# Patient Record
Sex: MALE | Race: WHITE
[De-identification: names, ages, dates, MRNs, and addresses within clinical notes are randomized per-mention and may not be internally consistent; named-entity substitution may affect disease eponyms.]

---

## 2017-08-27 ENCOUNTER — HOSPITAL ENCOUNTER (EMERGENCY)
Dept: HOSPITAL 83 - ED | Age: 32
Discharge: HOME | End: 2017-08-27
Payer: COMMERCIAL

## 2017-08-27 VITALS — BODY MASS INDEX: 39.28 KG/M2 | WEIGHT: 290 LBS | HEIGHT: 71.97 IN

## 2017-08-27 DIAGNOSIS — Z88.2: ICD-10-CM

## 2017-08-27 DIAGNOSIS — R03.0: ICD-10-CM

## 2017-08-27 DIAGNOSIS — M54.42: Primary | ICD-10-CM

## 2017-12-13 ENCOUNTER — HOSPITAL ENCOUNTER (EMERGENCY)
Dept: HOSPITAL 83 - ED | Age: 32
Discharge: HOME | End: 2017-12-13
Payer: COMMERCIAL

## 2017-12-13 VITALS — HEIGHT: 71.97 IN | WEIGHT: 295 LBS | BODY MASS INDEX: 39.96 KG/M2

## 2017-12-13 DIAGNOSIS — L02.411: Primary | ICD-10-CM

## 2017-12-13 DIAGNOSIS — Z88.2: ICD-10-CM

## 2019-09-09 ENCOUNTER — HOSPITAL ENCOUNTER (OUTPATIENT)
Dept: HOSPITAL 83 - LAB | Age: 34
Discharge: HOME | End: 2019-09-09
Attending: NURSE PRACTITIONER
Payer: COMMERCIAL

## 2019-09-09 DIAGNOSIS — M10.071: Primary | ICD-10-CM

## 2019-09-09 DIAGNOSIS — M10.9: ICD-10-CM

## 2020-08-04 ENCOUNTER — HOSPITAL ENCOUNTER (OUTPATIENT)
Dept: HOSPITAL 83 - COVID19 | Age: 35
Discharge: HOME | End: 2020-08-04
Attending: NURSE PRACTITIONER
Payer: COMMERCIAL

## 2020-08-04 DIAGNOSIS — R50.9: ICD-10-CM

## 2020-08-04 DIAGNOSIS — R52: ICD-10-CM

## 2020-08-04 DIAGNOSIS — R05: ICD-10-CM

## 2020-08-04 DIAGNOSIS — Z20.828: Primary | ICD-10-CM

## 2021-01-01 ENCOUNTER — APPOINTMENT (OUTPATIENT)
Dept: GENERAL RADIOLOGY | Age: 36
DRG: 871 | End: 2021-01-01
Payer: COMMERCIAL

## 2021-01-01 ENCOUNTER — APPOINTMENT (OUTPATIENT)
Dept: CT IMAGING | Age: 36
DRG: 871 | End: 2021-01-01
Payer: COMMERCIAL

## 2021-01-01 ENCOUNTER — HOSPITAL ENCOUNTER (INPATIENT)
Age: 36
LOS: 4 days | DRG: 871 | End: 2021-05-27
Attending: EMERGENCY MEDICINE | Admitting: INTERNAL MEDICINE
Payer: COMMERCIAL

## 2021-01-01 VITALS
SYSTOLIC BLOOD PRESSURE: 92 MMHG | WEIGHT: 293.25 LBS | TEMPERATURE: 99.4 F | BODY MASS INDEX: 38.87 KG/M2 | OXYGEN SATURATION: 91 % | HEIGHT: 73 IN | HEART RATE: 50 BPM | DIASTOLIC BLOOD PRESSURE: 51 MMHG | RESPIRATION RATE: 12 BRPM

## 2021-01-01 DIAGNOSIS — R06.09 DYSPNEA ON EXERTION: ICD-10-CM

## 2021-01-01 DIAGNOSIS — U07.1 COVID-19: Primary | ICD-10-CM

## 2021-01-01 DIAGNOSIS — J96.01 ACUTE RESPIRATORY FAILURE WITH HYPOXIA (HCC): ICD-10-CM

## 2021-01-01 LAB
AADO2: 219.9 MMHG
AADO2: 594.8 MMHG
ALBUMIN SERPL-MCNC: 3 G/DL (ref 3.5–5.2)
ALBUMIN SERPL-MCNC: 3.5 G/DL (ref 3.5–5.2)
ALBUMIN SERPL-MCNC: 3.9 G/DL (ref 3.5–5.2)
ALBUMIN SERPL-MCNC: 4.1 G/DL (ref 3.5–5.2)
ALP BLD-CCNC: 108 U/L (ref 40–129)
ALP BLD-CCNC: 67 U/L (ref 40–129)
ALP BLD-CCNC: 76 U/L (ref 40–129)
ALP BLD-CCNC: 79 U/L (ref 40–129)
ALT SERPL-CCNC: 26 U/L (ref 0–40)
ALT SERPL-CCNC: 27 U/L (ref 0–40)
ALT SERPL-CCNC: 31 U/L (ref 0–40)
ALT SERPL-CCNC: 35 U/L (ref 0–40)
ANION GAP SERPL CALCULATED.3IONS-SCNC: 11 MMOL/L (ref 7–16)
ANION GAP SERPL CALCULATED.3IONS-SCNC: 12 MMOL/L (ref 7–16)
ANION GAP SERPL CALCULATED.3IONS-SCNC: 13 MMOL/L (ref 7–16)
ANION GAP SERPL CALCULATED.3IONS-SCNC: 14 MMOL/L (ref 7–16)
ANISOCYTOSIS: ABNORMAL
ANISOCYTOSIS: ABNORMAL
APTT: 28.4 SEC (ref 24.5–35.1)
AST SERPL-CCNC: 25 U/L (ref 0–39)
AST SERPL-CCNC: 32 U/L (ref 0–39)
AST SERPL-CCNC: 35 U/L (ref 0–39)
AST SERPL-CCNC: 37 U/L (ref 0–39)
ATYPICAL LYMPHOCYTE RELATIVE PERCENT: 0.9 % (ref 0–4)
B.E.: -1.8 MMOL/L (ref -3–0)
B.E.: -1.8 MMOL/L (ref -3–3)
B.E.: -6.5 MMOL/L (ref -3–3)
BASOPHILS ABSOLUTE: 0 E9/L (ref 0–0.2)
BASOPHILS ABSOLUTE: 0.01 E9/L (ref 0–0.2)
BASOPHILS ABSOLUTE: 0.01 E9/L (ref 0–0.2)
BASOPHILS RELATIVE PERCENT: 0 % (ref 0–2)
BASOPHILS RELATIVE PERCENT: 0.2 % (ref 0–2)
BASOPHILS RELATIVE PERCENT: 0.2 % (ref 0–2)
BILIRUB SERPL-MCNC: 0.5 MG/DL (ref 0–1.2)
BILIRUB SERPL-MCNC: 0.5 MG/DL (ref 0–1.2)
BILIRUB SERPL-MCNC: 0.6 MG/DL (ref 0–1.2)
BILIRUB SERPL-MCNC: 0.7 MG/DL (ref 0–1.2)
BILIRUBIN DIRECT: <0.2 MG/DL (ref 0–0.3)
BILIRUBIN, INDIRECT: ABNORMAL MG/DL (ref 0–1)
BUN BLDV-MCNC: 17 MG/DL (ref 6–20)
BUN BLDV-MCNC: 19 MG/DL (ref 6–20)
BUN BLDV-MCNC: 20 MG/DL (ref 6–20)
BUN BLDV-MCNC: 24 MG/DL (ref 6–20)
C-REACTIVE PROTEIN: 1.6 MG/DL (ref 0–0.4)
C-REACTIVE PROTEIN: 12.2 MG/DL (ref 0–0.4)
C-REACTIVE PROTEIN: 5.8 MG/DL (ref 0–0.4)
C-REACTIVE PROTEIN: 6.7 MG/DL (ref 0–0.4)
CALCIUM SERPL-MCNC: 8.6 MG/DL (ref 8.6–10.2)
CALCIUM SERPL-MCNC: 8.6 MG/DL (ref 8.6–10.2)
CALCIUM SERPL-MCNC: 8.9 MG/DL (ref 8.6–10.2)
CALCIUM SERPL-MCNC: 9.4 MG/DL (ref 8.6–10.2)
CHLORIDE BLD-SCNC: 100 MMOL/L (ref 98–107)
CHLORIDE BLD-SCNC: 102 MMOL/L (ref 98–107)
CHLORIDE BLD-SCNC: 105 MMOL/L (ref 98–107)
CHLORIDE BLD-SCNC: 105 MMOL/L (ref 98–107)
CO2: 20 MMOL/L (ref 22–29)
CO2: 22 MMOL/L (ref 22–29)
CO2: 24 MMOL/L (ref 22–29)
CO2: 26 MMOL/L (ref 22–29)
COHB: 0.7 % (ref 0–1.5)
COHB: 0.8 % (ref 0–1.5)
CREAT SERPL-MCNC: 0.8 MG/DL (ref 0.7–1.2)
CREAT SERPL-MCNC: 0.9 MG/DL (ref 0.7–1.2)
CREAT SERPL-MCNC: 1 MG/DL (ref 0.7–1.2)
CREAT SERPL-MCNC: 1.1 MG/DL (ref 0.7–1.2)
CRITICAL: ABNORMAL
CRITICAL: NORMAL
D DIMER: 1231 NG/ML DDU
D DIMER: 333 NG/ML DDU
D DIMER: >5250 NG/ML DDU
DATE ANALYZED: ABNORMAL
DATE ANALYZED: NORMAL
DATE OF COLLECTION: ABNORMAL
DATE OF COLLECTION: NORMAL
DELIVERY SYSTEMS: ABNORMAL
DEVICE: ABNORMAL
EKG ATRIAL RATE: 92 BPM
EKG P AXIS: 33 DEGREES
EKG P-R INTERVAL: 130 MS
EKG Q-T INTERVAL: 340 MS
EKG QRS DURATION: 96 MS
EKG QTC CALCULATION (BAZETT): 420 MS
EKG R AXIS: 4 DEGREES
EKG T AXIS: 10 DEGREES
EKG VENTRICULAR RATE: 92 BPM
EOSINOPHILS ABSOLUTE: 0 E9/L (ref 0.05–0.5)
EOSINOPHILS RELATIVE PERCENT: 0 % (ref 0–6)
FERRITIN: 1677 NG/ML
FIBRINOGEN: 212 MG/DL (ref 225–540)
FIBRINOGEN: >700 MG/DL (ref 225–540)
FIO2 ARTERIAL: 100
FIO2: 100 %
FIO2: 50 %
GFR AFRICAN AMERICAN: >60
GFR NON-AFRICAN AMERICAN: >60 ML/MIN/1.73
GLUCOSE BLD-MCNC: 157 MG/DL (ref 74–99)
GLUCOSE BLD-MCNC: 172 MG/DL (ref 74–99)
GLUCOSE BLD-MCNC: 184 MG/DL (ref 74–99)
GLUCOSE BLD-MCNC: 195 MG/DL (ref 74–99)
HBA1C MFR BLD: 6.2 % (ref 4–5.6)
HCO3 ARTERIAL: 24.6 MMOL/L (ref 22–26)
HCO3: 17.7 MMOL/L (ref 22–26)
HCO3: 22.2 MMOL/L (ref 22–26)
HCT VFR BLD CALC: 40.9 % (ref 37–54)
HCT VFR BLD CALC: 42.5 % (ref 37–54)
HCT VFR BLD CALC: 44.8 % (ref 37–54)
HCT VFR BLD CALC: 47.5 % (ref 37–54)
HCT VFR BLD CALC: 47.6 % (ref 37–54)
HEMOGLOBIN: 13.7 G/DL (ref 12.5–16.5)
HEMOGLOBIN: 13.7 G/DL (ref 12.5–16.5)
HEMOGLOBIN: 14.4 G/DL (ref 12.5–16.5)
HEMOGLOBIN: 15.5 G/DL (ref 12.5–16.5)
HEMOGLOBIN: 15.5 G/DL (ref 12.5–16.5)
HHB: 3.4 % (ref 0–5)
HHB: 9.7 % (ref 0–5)
IMMATURE GRANULOCYTES #: 0.08 E9/L
IMMATURE GRANULOCYTES #: 0.17 E9/L
IMMATURE GRANULOCYTES %: 1.4 % (ref 0–5)
IMMATURE GRANULOCYTES %: 4.2 % (ref 0–5)
INR BLD: 1.1
LAB: ABNORMAL
LAB: NORMAL
LACTATE DEHYDROGENASE: 583 U/L (ref 135–225)
LACTIC ACID: 1.3 MMOL/L (ref 0.5–2.2)
LACTIC ACID: 2.8 MMOL/L (ref 0.5–2.2)
LYMPHOCYTES ABSOLUTE: 0.48 E9/L (ref 1.5–4)
LYMPHOCYTES ABSOLUTE: 0.58 E9/L (ref 1.5–4)
LYMPHOCYTES ABSOLUTE: 0.63 E9/L (ref 1.5–4)
LYMPHOCYTES ABSOLUTE: 0.93 E9/L (ref 1.5–4)
LYMPHOCYTES ABSOLUTE: 1.47 E9/L (ref 1.5–4)
LYMPHOCYTES RELATIVE PERCENT: 11.6 % (ref 20–42)
LYMPHOCYTES RELATIVE PERCENT: 14.2 % (ref 20–42)
LYMPHOCYTES RELATIVE PERCENT: 16.1 % (ref 20–42)
LYMPHOCYTES RELATIVE PERCENT: 7 % (ref 20–42)
LYMPHOCYTES RELATIVE PERCENT: 8.7 % (ref 20–42)
Lab: ABNORMAL
Lab: NORMAL
MAGNESIUM: 2.4 MG/DL (ref 1.6–2.6)
MAGNESIUM: 2.7 MG/DL (ref 1.6–2.6)
MCH RBC QN AUTO: 27.7 PG (ref 26–35)
MCH RBC QN AUTO: 28.3 PG (ref 26–35)
MCH RBC QN AUTO: 28.8 PG (ref 26–35)
MCHC RBC AUTO-ENTMCNC: 32.1 % (ref 32–34.5)
MCHC RBC AUTO-ENTMCNC: 32.2 % (ref 32–34.5)
MCHC RBC AUTO-ENTMCNC: 32.6 % (ref 32–34.5)
MCHC RBC AUTO-ENTMCNC: 32.6 % (ref 32–34.5)
MCHC RBC AUTO-ENTMCNC: 33.5 % (ref 32–34.5)
MCV RBC AUTO: 86.1 FL (ref 80–99.9)
MCV RBC AUTO: 86.2 FL (ref 80–99.9)
MCV RBC AUTO: 86.8 FL (ref 80–99.9)
MCV RBC AUTO: 86.9 FL (ref 80–99.9)
MCV RBC AUTO: 87.8 FL (ref 80–99.9)
METAMYELOCYTES RELATIVE PERCENT: 1.7 % (ref 0–1)
METAMYELOCYTES RELATIVE PERCENT: 1.8 % (ref 0–1)
METAMYELOCYTES RELATIVE PERCENT: 2 % (ref 0–1)
METER GLUCOSE: 174 MG/DL (ref 74–99)
METHB: 0.2 % (ref 0–1.5)
METHB: 0.3 % (ref 0–1.5)
MODE: ABNORMAL
MODE: NORMAL
MONOCYTES ABSOLUTE: 0.18 E9/L (ref 0.1–0.95)
MONOCYTES ABSOLUTE: 0.23 E9/L (ref 0.1–0.95)
MONOCYTES ABSOLUTE: 0.26 E9/L (ref 0.1–0.95)
MONOCYTES ABSOLUTE: 0.42 E9/L (ref 0.1–0.95)
MONOCYTES ABSOLUTE: 0.42 E9/L (ref 0.1–0.95)
MONOCYTES RELATIVE PERCENT: 1.8 % (ref 2–12)
MONOCYTES RELATIVE PERCENT: 2 % (ref 2–12)
MONOCYTES RELATIVE PERCENT: 6.4 % (ref 2–12)
MONOCYTES RELATIVE PERCENT: 7.3 % (ref 2–12)
MONOCYTES RELATIVE PERCENT: 7.8 % (ref 2–12)
MYELOCYTE PERCENT: 0.9 % (ref 0–0)
NEUTROPHILS ABSOLUTE: 3.06 E9/L (ref 1.8–7.3)
NEUTROPHILS ABSOLUTE: 4.34 E9/L (ref 1.8–7.3)
NEUTROPHILS ABSOLUTE: 4.45 E9/L (ref 1.8–7.3)
NEUTROPHILS ABSOLUTE: 8.19 E9/L (ref 1.8–7.3)
NEUTROPHILS ABSOLUTE: 9.72 E9/L (ref 1.8–7.3)
NEUTROPHILS RELATIVE PERCENT: 75 % (ref 43–80)
NEUTROPHILS RELATIVE PERCENT: 75 % (ref 43–80)
NEUTROPHILS RELATIVE PERCENT: 80.9 % (ref 43–80)
NEUTROPHILS RELATIVE PERCENT: 83.9 % (ref 43–80)
NEUTROPHILS RELATIVE PERCENT: 89 % (ref 43–80)
NUCLEATED RED BLOOD CELLS: 0 /100 WBC
NUCLEATED RED BLOOD CELLS: 0 /100 WBC
NUCLEATED RED BLOOD CELLS: 1 /100 WBC
O2 CONTENT: 21 ML/DL
O2 CONTENT: 21.3 ML/DL
O2 SATURATION: 82.5 % (ref 92–98.5)
O2 SATURATION: 90.2 % (ref 92–98.5)
O2 SATURATION: 96.6 % (ref 92–98.5)
O2HB: 89.2 % (ref 94–97)
O2HB: 95.7 % (ref 94–97)
OPERATOR ID: 166
OPERATOR ID: 166
OPERATOR ID: 516
PATIENT TEMP: 37 C
PATIENT TEMP: 37 C
PCO2 ARTERIAL: 48.1 MMHG (ref 35–45)
PCO2: 32.4 MMHG (ref 35–45)
PCO2: 35.8 MMHG (ref 35–45)
PDW BLD-RTO: 12.4 FL (ref 11.5–15)
PDW BLD-RTO: 12.4 FL (ref 11.5–15)
PDW BLD-RTO: 12.6 FL (ref 11.5–15)
PDW BLD-RTO: 12.6 FL (ref 11.5–15)
PDW BLD-RTO: 12.7 FL (ref 11.5–15)
PEEP/CPAP: 6 CMH2O
PFO2: 0.61 MMHG/%
PFO2: 1.68 MMHG/%
PH BLOOD GAS: 7.32 (ref 7.35–7.45)
PH BLOOD GAS: 7.36 (ref 7.35–7.45)
PH BLOOD GAS: 7.41 (ref 7.35–7.45)
PHOSPHORUS: 3.8 MG/DL (ref 2.5–4.5)
PHOSPHORUS: 5.4 MG/DL (ref 2.5–4.5)
PIP: 12 CMH2O
PLATELET # BLD: 199 E9/L (ref 130–450)
PLATELET # BLD: 218 E9/L (ref 130–450)
PLATELET # BLD: 247 E9/L (ref 130–450)
PLATELET # BLD: 280 E9/L (ref 130–450)
PLATELET # BLD: 281 E9/L (ref 130–450)
PMV BLD AUTO: 9.4 FL (ref 7–12)
PMV BLD AUTO: 9.5 FL (ref 7–12)
PO2 ARTERIAL: 51.3 MMHG (ref 80–100)
PO2: 60.8 MMHG (ref 75–100)
PO2: 83.8 MMHG (ref 75–100)
POLYCHROMASIA: ABNORMAL
POTASSIUM REFLEX MAGNESIUM: 4.5 MMOL/L (ref 3.5–5)
POTASSIUM REFLEX MAGNESIUM: 4.6 MMOL/L (ref 3.5–5)
POTASSIUM SERPL-SCNC: 4.8 MMOL/L (ref 3.5–5)
POTASSIUM SERPL-SCNC: 4.8 MMOL/L (ref 3.5–5)
PRO-BNP: 53 PG/ML (ref 0–125)
PROCALCITONIN: 0.1 NG/ML (ref 0–0.08)
PROCALCITONIN: 0.11 NG/ML (ref 0–0.08)
PROCALCITONIN: 0.13 NG/ML (ref 0–0.08)
PROCALCITONIN: 0.2 NG/ML (ref 0–0.08)
PROTHROMBIN TIME: 11.8 SEC (ref 9.3–12.4)
RBC # BLD: 4.75 E12/L (ref 3.8–5.8)
RBC # BLD: 4.84 E12/L (ref 3.8–5.8)
RBC # BLD: 5.2 E12/L (ref 3.8–5.8)
RBC # BLD: 5.47 E12/L (ref 3.8–5.8)
RBC # BLD: 5.48 E12/L (ref 3.8–5.8)
RBC # BLD: NORMAL 10*6/UL
RI(T): 262 %
RI(T): 978 %
SARS-COV-2: DETECTED
SEDIMENTATION RATE, ERYTHROCYTE: 0 MM/HR (ref 0–15)
SODIUM BLD-SCNC: 136 MMOL/L (ref 132–146)
SODIUM BLD-SCNC: 139 MMOL/L (ref 132–146)
SODIUM BLD-SCNC: 139 MMOL/L (ref 132–146)
SODIUM BLD-SCNC: 140 MMOL/L (ref 132–146)
SOURCE, BLOOD GAS: ABNORMAL
SOURCE, BLOOD GAS: ABNORMAL
SOURCE, BLOOD GAS: NORMAL
THB: 15.6 G/DL (ref 11.5–16.5)
THB: 17 G/DL (ref 11.5–16.5)
TIME ANALYZED: 1039
TIME ANALYZED: 1510
TOTAL PROTEIN: 6.6 G/DL (ref 6.4–8.3)
TOTAL PROTEIN: 6.9 G/DL (ref 6.4–8.3)
TOTAL PROTEIN: 7 G/DL (ref 6.4–8.3)
TOTAL PROTEIN: 7.9 G/DL (ref 6.4–8.3)
TROPONIN: <0.01 NG/ML (ref 0–0.03)
WBC # BLD: 11.3 E9/L (ref 4.5–11.5)
WBC # BLD: 4.1 E9/L (ref 4.5–11.5)
WBC # BLD: 5.3 E9/L (ref 4.5–11.5)
WBC # BLD: 5.8 E9/L (ref 4.5–11.5)
WBC # BLD: 9 E9/L (ref 4.5–11.5)

## 2021-01-01 PROCEDURE — 84145 PROCALCITONIN (PCT): CPT

## 2021-01-01 PROCEDURE — 85025 COMPLETE CBC W/AUTO DIFF WBC: CPT

## 2021-01-01 PROCEDURE — 6360000002 HC RX W HCPCS

## 2021-01-01 PROCEDURE — 80048 BASIC METABOLIC PNL TOTAL CA: CPT

## 2021-01-01 PROCEDURE — 2500000003 HC RX 250 WO HCPCS

## 2021-01-01 PROCEDURE — 84100 ASSAY OF PHOSPHORUS: CPT

## 2021-01-01 PROCEDURE — 82805 BLOOD GASES W/O2 SATURATION: CPT

## 2021-01-01 PROCEDURE — 2060000000 HC ICU INTERMEDIATE R&B

## 2021-01-01 PROCEDURE — 71045 X-RAY EXAM CHEST 1 VIEW: CPT

## 2021-01-01 PROCEDURE — 85651 RBC SED RATE NONAUTOMATED: CPT

## 2021-01-01 PROCEDURE — 6360000002 HC RX W HCPCS: Performed by: STUDENT IN AN ORGANIZED HEALTH CARE EDUCATION/TRAINING PROGRAM

## 2021-01-01 PROCEDURE — 36415 COLL VENOUS BLD VENIPUNCTURE: CPT

## 2021-01-01 PROCEDURE — XW033E5 INTRODUCTION OF REMDESIVIR ANTI-INFECTIVE INTO PERIPHERAL VEIN, PERCUTANEOUS APPROACH, NEW TECHNOLOGY GROUP 5: ICD-10-PCS | Performed by: INTERNAL MEDICINE

## 2021-01-01 PROCEDURE — 0W9930Z DRAINAGE OF RIGHT PLEURAL CAVITY WITH DRAINAGE DEVICE, PERCUTANEOUS APPROACH: ICD-10-PCS | Performed by: INTERNAL MEDICINE

## 2021-01-01 PROCEDURE — 2500000003 HC RX 250 WO HCPCS: Performed by: STUDENT IN AN ORGANIZED HEALTH CARE EDUCATION/TRAINING PROGRAM

## 2021-01-01 PROCEDURE — 94660 CPAP INITIATION&MGMT: CPT

## 2021-01-01 PROCEDURE — 99232 SBSQ HOSP IP/OBS MODERATE 35: CPT | Performed by: INTERNAL MEDICINE

## 2021-01-01 PROCEDURE — 83036 HEMOGLOBIN GLYCOSYLATED A1C: CPT

## 2021-01-01 PROCEDURE — 2500000003 HC RX 250 WO HCPCS: Performed by: INTERNAL MEDICINE

## 2021-01-01 PROCEDURE — 2580000003 HC RX 258: Performed by: EMERGENCY MEDICINE

## 2021-01-01 PROCEDURE — 2580000003 HC RX 258: Performed by: INTERNAL MEDICINE

## 2021-01-01 PROCEDURE — 99223 1ST HOSP IP/OBS HIGH 75: CPT | Performed by: INTERNAL MEDICINE

## 2021-01-01 PROCEDURE — 85730 THROMBOPLASTIN TIME PARTIAL: CPT

## 2021-01-01 PROCEDURE — 6370000000 HC RX 637 (ALT 250 FOR IP): Performed by: NURSE PRACTITIONER

## 2021-01-01 PROCEDURE — 86140 C-REACTIVE PROTEIN: CPT

## 2021-01-01 PROCEDURE — 6360000002 HC RX W HCPCS: Performed by: INTERNAL MEDICINE

## 2021-01-01 PROCEDURE — 32551 INSERTION OF CHEST TUBE: CPT

## 2021-01-01 PROCEDURE — 96374 THER/PROPH/DIAG INJ IV PUSH: CPT

## 2021-01-01 PROCEDURE — 6370000000 HC RX 637 (ALT 250 FOR IP): Performed by: INTERNAL MEDICINE

## 2021-01-01 PROCEDURE — 85378 FIBRIN DEGRADE SEMIQUANT: CPT

## 2021-01-01 PROCEDURE — 85610 PROTHROMBIN TIME: CPT

## 2021-01-01 PROCEDURE — 2700000000 HC OXYGEN THERAPY PER DAY

## 2021-01-01 PROCEDURE — 6360000002 HC RX W HCPCS: Performed by: EMERGENCY MEDICINE

## 2021-01-01 PROCEDURE — C9113 INJ PANTOPRAZOLE SODIUM, VIA: HCPCS | Performed by: STUDENT IN AN ORGANIZED HEALTH CARE EDUCATION/TRAINING PROGRAM

## 2021-01-01 PROCEDURE — 6360000004 HC RX CONTRAST MEDICATION: Performed by: RADIOLOGY

## 2021-01-01 PROCEDURE — 83735 ASSAY OF MAGNESIUM: CPT

## 2021-01-01 PROCEDURE — 36600 WITHDRAWAL OF ARTERIAL BLOOD: CPT

## 2021-01-01 PROCEDURE — 2000000000 HC ICU R&B

## 2021-01-01 PROCEDURE — 0BH17EZ INSERTION OF ENDOTRACHEAL AIRWAY INTO TRACHEA, VIA NATURAL OR ARTIFICIAL OPENING: ICD-10-PCS | Performed by: INTERNAL MEDICINE

## 2021-01-01 PROCEDURE — 2580000003 HC RX 258: Performed by: STUDENT IN AN ORGANIZED HEALTH CARE EDUCATION/TRAINING PROGRAM

## 2021-01-01 PROCEDURE — 85384 FIBRINOGEN ACTIVITY: CPT

## 2021-01-01 PROCEDURE — 82962 GLUCOSE BLOOD TEST: CPT

## 2021-01-01 PROCEDURE — 82803 BLOOD GASES ANY COMBINATION: CPT

## 2021-01-01 PROCEDURE — 99285 EMERGENCY DEPT VISIT HI MDM: CPT

## 2021-01-01 PROCEDURE — 83605 ASSAY OF LACTIC ACID: CPT

## 2021-01-01 PROCEDURE — 51702 INSERT TEMP BLADDER CATH: CPT

## 2021-01-01 PROCEDURE — 0W9B30Z DRAINAGE OF LEFT PLEURAL CAVITY WITH DRAINAGE DEVICE, PERCUTANEOUS APPROACH: ICD-10-PCS | Performed by: INTERNAL MEDICINE

## 2021-01-01 PROCEDURE — 83880 ASSAY OF NATRIURETIC PEPTIDE: CPT

## 2021-01-01 PROCEDURE — 6370000000 HC RX 637 (ALT 250 FOR IP): Performed by: EMERGENCY MEDICINE

## 2021-01-01 PROCEDURE — 80053 COMPREHEN METABOLIC PANEL: CPT

## 2021-01-01 PROCEDURE — 92950 HEART/LUNG RESUSCITATION CPR: CPT

## 2021-01-01 PROCEDURE — 71275 CT ANGIOGRAPHY CHEST: CPT

## 2021-01-01 PROCEDURE — 80076 HEPATIC FUNCTION PANEL: CPT

## 2021-01-01 PROCEDURE — 93010 ELECTROCARDIOGRAM REPORT: CPT | Performed by: INTERNAL MEDICINE

## 2021-01-01 PROCEDURE — 6370000000 HC RX 637 (ALT 250 FOR IP): Performed by: STUDENT IN AN ORGANIZED HEALTH CARE EDUCATION/TRAINING PROGRAM

## 2021-01-01 PROCEDURE — 84484 ASSAY OF TROPONIN QUANT: CPT

## 2021-01-01 PROCEDURE — 87040 BLOOD CULTURE FOR BACTERIA: CPT

## 2021-01-01 PROCEDURE — 82728 ASSAY OF FERRITIN: CPT

## 2021-01-01 PROCEDURE — 93005 ELECTROCARDIOGRAM TRACING: CPT | Performed by: EMERGENCY MEDICINE

## 2021-01-01 PROCEDURE — 31500 INSERT EMERGENCY AIRWAY: CPT

## 2021-01-01 PROCEDURE — 5A1935Z RESPIRATORY VENTILATION, LESS THAN 24 CONSECUTIVE HOURS: ICD-10-PCS | Performed by: INTERNAL MEDICINE

## 2021-01-01 PROCEDURE — 83615 LACTATE (LD) (LDH) ENZYME: CPT

## 2021-01-01 RX ORDER — MIDAZOLAM HYDROCHLORIDE 2 MG/2ML
4 INJECTION, SOLUTION INTRAMUSCULAR; INTRAVENOUS ONCE
Status: DISCONTINUED | OUTPATIENT
Start: 2021-01-01 | End: 2021-05-28 | Stop reason: HOSPADM

## 2021-01-01 RX ORDER — CALCIUM CHLORIDE 100 MG/ML
INJECTION INTRAVENOUS; INTRAVENTRICULAR
Status: COMPLETED | OUTPATIENT
Start: 2021-01-01 | End: 2021-01-01

## 2021-01-01 RX ORDER — SODIUM CHLORIDE 0.9 % (FLUSH) 0.9 %
5-40 SYRINGE (ML) INJECTION EVERY 12 HOURS SCHEDULED
Status: DISCONTINUED | OUTPATIENT
Start: 2021-01-01 | End: 2021-05-28 | Stop reason: HOSPADM

## 2021-01-01 RX ORDER — BENZONATATE 100 MG/1
100 CAPSULE ORAL 3 TIMES DAILY PRN
Status: DISCONTINUED | OUTPATIENT
Start: 2021-01-01 | End: 2021-05-28 | Stop reason: HOSPADM

## 2021-01-01 RX ORDER — MIDAZOLAM HYDROCHLORIDE 2 MG/2ML
2 INJECTION, SOLUTION INTRAMUSCULAR; INTRAVENOUS ONCE
Status: COMPLETED | OUTPATIENT
Start: 2021-01-01 | End: 2021-01-01

## 2021-01-01 RX ORDER — CHOLECALCIFEROL (VITAMIN D3) 50 MCG
2000 TABLET ORAL DAILY
Status: DISCONTINUED | OUTPATIENT
Start: 2021-01-01 | End: 2021-05-28 | Stop reason: HOSPADM

## 2021-01-01 RX ORDER — POTASSIUM CHLORIDE 7.45 MG/ML
10 INJECTION INTRAVENOUS
Status: DISPENSED | OUTPATIENT
Start: 2021-01-01 | End: 2021-01-01

## 2021-01-01 RX ORDER — DIPHENHYDRAMINE HCL 25 MG
25 TABLET ORAL NIGHTLY PRN
Status: DISCONTINUED | OUTPATIENT
Start: 2021-01-01 | End: 2021-05-28 | Stop reason: HOSPADM

## 2021-01-01 RX ORDER — EPINEPHRINE 0.1 MG/ML
SYRINGE (ML) INJECTION
Status: COMPLETED | OUTPATIENT
Start: 2021-01-01 | End: 2021-01-01

## 2021-01-01 RX ORDER — PROPOFOL 10 MG/ML
INJECTION, EMULSION INTRAVENOUS
Status: DISCONTINUED
Start: 2021-01-01 | End: 2021-05-28 | Stop reason: HOSPADM

## 2021-01-01 RX ORDER — CALCIUM CARBONATE 200(500)MG
500 TABLET,CHEWABLE ORAL 3 TIMES DAILY PRN
Status: DISCONTINUED | OUTPATIENT
Start: 2021-01-01 | End: 2021-05-28 | Stop reason: HOSPADM

## 2021-01-01 RX ORDER — DOPAMINE HYDROCHLORIDE 320 MG/100ML
INJECTION, SOLUTION INTRAVENOUS CONTINUOUS PRN
Status: COMPLETED | OUTPATIENT
Start: 2021-01-01 | End: 2021-01-01

## 2021-01-01 RX ORDER — SODIUM CHLORIDE 9 MG/ML
10 INJECTION INTRAVENOUS DAILY
Status: DISCONTINUED | OUTPATIENT
Start: 2021-01-01 | End: 2021-05-28 | Stop reason: HOSPADM

## 2021-01-01 RX ORDER — ALBUTEROL SULFATE 90 UG/1
2 AEROSOL, METERED RESPIRATORY (INHALATION) EVERY 6 HOURS PRN
Status: DISCONTINUED | OUTPATIENT
Start: 2021-01-01 | End: 2021-05-28 | Stop reason: HOSPADM

## 2021-01-01 RX ORDER — LORAZEPAM 2 MG/ML
0.5 INJECTION INTRAMUSCULAR ONCE
Status: COMPLETED | OUTPATIENT
Start: 2021-01-01 | End: 2021-01-01

## 2021-01-01 RX ORDER — PANTOPRAZOLE SODIUM 40 MG/10ML
40 INJECTION, POWDER, LYOPHILIZED, FOR SOLUTION INTRAVENOUS DAILY
Status: DISCONTINUED | OUTPATIENT
Start: 2021-01-01 | End: 2021-05-28 | Stop reason: HOSPADM

## 2021-01-01 RX ORDER — ACETAMINOPHEN 325 MG/1
650 TABLET ORAL EVERY 6 HOURS PRN
Status: DISCONTINUED | OUTPATIENT
Start: 2021-01-01 | End: 2021-05-28 | Stop reason: HOSPADM

## 2021-01-01 RX ORDER — SODIUM CHLORIDE 0.9 % (FLUSH) 0.9 %
5-40 SYRINGE (ML) INJECTION PRN
Status: DISCONTINUED | OUTPATIENT
Start: 2021-01-01 | End: 2021-05-28 | Stop reason: HOSPADM

## 2021-01-01 RX ORDER — SODIUM CHLORIDE 9 MG/ML
25 INJECTION, SOLUTION INTRAVENOUS PRN
Status: DISCONTINUED | OUTPATIENT
Start: 2021-01-01 | End: 2021-05-28 | Stop reason: HOSPADM

## 2021-01-01 RX ORDER — PROMETHAZINE HYDROCHLORIDE 25 MG/1
12.5 TABLET ORAL EVERY 6 HOURS PRN
Status: DISCONTINUED | OUTPATIENT
Start: 2021-01-01 | End: 2021-05-28 | Stop reason: HOSPADM

## 2021-01-01 RX ORDER — 0.9 % SODIUM CHLORIDE 0.9 %
1000 INTRAVENOUS SOLUTION INTRAVENOUS ONCE
Status: COMPLETED | OUTPATIENT
Start: 2021-01-01 | End: 2021-01-01

## 2021-01-01 RX ORDER — SODIUM CHLORIDE 9 MG/ML
INJECTION, SOLUTION INTRAVENOUS CONTINUOUS PRN
Status: COMPLETED | OUTPATIENT
Start: 2021-01-01 | End: 2021-01-01

## 2021-01-01 RX ORDER — ALLOPURINOL 100 MG/1
100 TABLET ORAL DAILY
COMMUNITY

## 2021-01-01 RX ORDER — FUROSEMIDE 10 MG/ML
40 INJECTION INTRAMUSCULAR; INTRAVENOUS ONCE
Status: COMPLETED | OUTPATIENT
Start: 2021-01-01 | End: 2021-01-01

## 2021-01-01 RX ORDER — ASCORBIC ACID 500 MG
500 TABLET ORAL 2 TIMES DAILY
Status: DISCONTINUED | OUTPATIENT
Start: 2021-01-01 | End: 2021-05-28 | Stop reason: HOSPADM

## 2021-01-01 RX ORDER — DEXAMETHASONE SODIUM PHOSPHATE 4 MG/ML
6 INJECTION, SOLUTION INTRA-ARTICULAR; INTRALESIONAL; INTRAMUSCULAR; INTRAVENOUS; SOFT TISSUE EVERY 12 HOURS
Status: DISCONTINUED | OUTPATIENT
Start: 2021-01-01 | End: 2021-05-28 | Stop reason: HOSPADM

## 2021-01-01 RX ORDER — FENTANYL CITRATE 50 UG/ML
INJECTION, SOLUTION INTRAMUSCULAR; INTRAVENOUS
Status: COMPLETED
Start: 2021-01-01 | End: 2021-01-01

## 2021-01-01 RX ORDER — LORAZEPAM 2 MG/ML
1 INJECTION INTRAMUSCULAR EVERY 6 HOURS PRN
Status: DISCONTINUED | OUTPATIENT
Start: 2021-01-01 | End: 2021-05-28 | Stop reason: HOSPADM

## 2021-01-01 RX ORDER — LIDOCAINE HYDROCHLORIDE 10 MG/ML
INJECTION, SOLUTION INFILTRATION; PERINEURAL
Status: COMPLETED
Start: 2021-01-01 | End: 2021-01-01

## 2021-01-01 RX ORDER — LIDOCAINE HYDROCHLORIDE AND EPINEPHRINE 10; 10 MG/ML; UG/ML
20 INJECTION, SOLUTION INFILTRATION; PERINEURAL ONCE
Status: DISCONTINUED | OUTPATIENT
Start: 2021-01-01 | End: 2021-01-01

## 2021-01-01 RX ORDER — HYDROXYZINE HYDROCHLORIDE 10 MG/1
25 TABLET, FILM COATED ORAL ONCE
Status: COMPLETED | OUTPATIENT
Start: 2021-01-01 | End: 2021-01-01

## 2021-01-01 RX ORDER — LIDOCAINE HYDROCHLORIDE 10 MG/ML
5 INJECTION, SOLUTION EPIDURAL; INFILTRATION; INTRACAUDAL; PERINEURAL ONCE
Status: DISCONTINUED | OUTPATIENT
Start: 2021-01-01 | End: 2021-01-01

## 2021-01-01 RX ORDER — MIDAZOLAM HYDROCHLORIDE 1 MG/ML
INJECTION INTRAMUSCULAR; INTRAVENOUS
Status: COMPLETED
Start: 2021-01-01 | End: 2021-01-01

## 2021-01-01 RX ORDER — DEXAMETHASONE SODIUM PHOSPHATE 10 MG/ML
6 INJECTION, SOLUTION INTRAMUSCULAR; INTRAVENOUS ONCE
Status: COMPLETED | OUTPATIENT
Start: 2021-01-01 | End: 2021-01-01

## 2021-01-01 RX ORDER — LORAZEPAM 2 MG/ML
INJECTION INTRAMUSCULAR
Status: COMPLETED
Start: 2021-01-01 | End: 2021-01-01

## 2021-01-01 RX ORDER — NICOTINE POLACRILEX 4 MG
15 LOZENGE BUCCAL PRN
Status: DISCONTINUED | OUTPATIENT
Start: 2021-01-01 | End: 2021-05-28 | Stop reason: HOSPADM

## 2021-01-01 RX ORDER — FENTANYL CITRATE 50 UG/ML
100 INJECTION, SOLUTION INTRAMUSCULAR; INTRAVENOUS ONCE
Status: COMPLETED | OUTPATIENT
Start: 2021-01-01 | End: 2021-01-01

## 2021-01-01 RX ORDER — SODIUM CHLORIDE 9 MG/ML
INJECTION, SOLUTION INTRAVENOUS CONTINUOUS
Status: DISCONTINUED | OUTPATIENT
Start: 2021-01-01 | End: 2021-05-28 | Stop reason: HOSPADM

## 2021-01-01 RX ORDER — LORAZEPAM 2 MG/ML
0.5 INJECTION INTRAMUSCULAR EVERY 6 HOURS PRN
Status: DISCONTINUED | OUTPATIENT
Start: 2021-01-01 | End: 2021-01-01

## 2021-01-01 RX ORDER — MIDAZOLAM HYDROCHLORIDE 1 MG/ML
INJECTION INTRAMUSCULAR; INTRAVENOUS
Status: DISCONTINUED
Start: 2021-01-01 | End: 2021-05-28 | Stop reason: HOSPADM

## 2021-01-01 RX ORDER — POTASSIUM CHLORIDE 29.8 MG/ML
40 INJECTION INTRAVENOUS ONCE
Status: DISCONTINUED | OUTPATIENT
Start: 2021-01-01 | End: 2021-01-01 | Stop reason: SDUPTHER

## 2021-01-01 RX ORDER — DEXTROSE MONOHYDRATE 50 MG/ML
100 INJECTION, SOLUTION INTRAVENOUS PRN
Status: DISCONTINUED | OUTPATIENT
Start: 2021-01-01 | End: 2021-05-28 | Stop reason: HOSPADM

## 2021-01-01 RX ORDER — POLYETHYLENE GLYCOL 3350 17 G/17G
17 POWDER, FOR SOLUTION ORAL DAILY PRN
Status: DISCONTINUED | OUTPATIENT
Start: 2021-01-01 | End: 2021-05-28 | Stop reason: HOSPADM

## 2021-01-01 RX ORDER — 0.9 % SODIUM CHLORIDE 0.9 %
30 INTRAVENOUS SOLUTION INTRAVENOUS PRN
Status: DISCONTINUED | OUTPATIENT
Start: 2021-01-01 | End: 2021-05-28 | Stop reason: HOSPADM

## 2021-01-01 RX ORDER — FUROSEMIDE 10 MG/ML
20 INJECTION INTRAMUSCULAR; INTRAVENOUS ONCE
Status: COMPLETED | OUTPATIENT
Start: 2021-01-01 | End: 2021-01-01

## 2021-01-01 RX ORDER — ZINC SULFATE 50(220)MG
50 CAPSULE ORAL DAILY
Status: DISCONTINUED | OUTPATIENT
Start: 2021-01-01 | End: 2021-05-28 | Stop reason: HOSPADM

## 2021-01-01 RX ORDER — ONDANSETRON 2 MG/ML
4 INJECTION INTRAMUSCULAR; INTRAVENOUS EVERY 6 HOURS PRN
Status: DISCONTINUED | OUTPATIENT
Start: 2021-01-01 | End: 2021-05-28 | Stop reason: HOSPADM

## 2021-01-01 RX ORDER — DEXTROSE MONOHYDRATE 25 G/50ML
12.5 INJECTION, SOLUTION INTRAVENOUS PRN
Status: DISCONTINUED | OUTPATIENT
Start: 2021-01-01 | End: 2021-05-28 | Stop reason: HOSPADM

## 2021-01-01 RX ORDER — ACETAMINOPHEN 650 MG/1
650 SUPPOSITORY RECTAL EVERY 6 HOURS PRN
Status: DISCONTINUED | OUTPATIENT
Start: 2021-01-01 | End: 2021-05-28 | Stop reason: HOSPADM

## 2021-01-01 RX ORDER — EPINEPHRINE 0.1 MG/ML
SYRINGE (ML) INJECTION
Status: DISCONTINUED
Start: 2021-01-01 | End: 2021-05-28 | Stop reason: HOSPADM

## 2021-01-01 RX ORDER — PROPOFOL 10 MG/ML
5-50 INJECTION, EMULSION INTRAVENOUS
Status: DISCONTINUED | OUTPATIENT
Start: 2021-01-01 | End: 2021-05-28 | Stop reason: HOSPADM

## 2021-01-01 RX ORDER — LIDOCAINE HYDROCHLORIDE AND EPINEPHRINE 10; 10 MG/ML; UG/ML
60 INJECTION, SOLUTION INFILTRATION; PERINEURAL ONCE
Status: COMPLETED | OUTPATIENT
Start: 2021-01-01 | End: 2021-01-01

## 2021-01-01 RX ORDER — LANOLIN ALCOHOL/MO/W.PET/CERES
100 CREAM (GRAM) TOPICAL DAILY
Status: DISCONTINUED | OUTPATIENT
Start: 2021-01-01 | End: 2021-05-28 | Stop reason: HOSPADM

## 2021-01-01 RX ORDER — DRONABINOL 2.5 MG/1
5 CAPSULE ORAL
Status: DISCONTINUED | OUTPATIENT
Start: 2021-01-01 | End: 2021-05-28 | Stop reason: HOSPADM

## 2021-01-01 RX ORDER — DIPHENHYDRAMINE HYDROCHLORIDE 50 MG/ML
25 INJECTION INTRAMUSCULAR; INTRAVENOUS EVERY 6 HOURS PRN
Status: DISCONTINUED | OUTPATIENT
Start: 2021-01-01 | End: 2021-05-28 | Stop reason: HOSPADM

## 2021-01-01 RX ORDER — DOPAMINE HYDROCHLORIDE 320 MG/100ML
INJECTION, SOLUTION INTRAVENOUS
Status: DISCONTINUED
Start: 2021-01-01 | End: 2021-05-28 | Stop reason: HOSPADM

## 2021-01-01 RX ORDER — ACETAMINOPHEN 500 MG
1000 TABLET ORAL ONCE
Status: COMPLETED | OUTPATIENT
Start: 2021-01-01 | End: 2021-01-01

## 2021-01-01 RX ADMIN — REMDESIVIR 100 MG: 100 INJECTION, POWDER, LYOPHILIZED, FOR SOLUTION INTRAVENOUS at 09:26

## 2021-01-01 RX ADMIN — OXYCODONE HYDROCHLORIDE AND ACETAMINOPHEN 500 MG: 500 TABLET ORAL at 12:07

## 2021-01-01 RX ADMIN — OXYCODONE HYDROCHLORIDE AND ACETAMINOPHEN 500 MG: 500 TABLET ORAL at 09:20

## 2021-01-01 RX ADMIN — SODIUM BICARBONATE 50 MEQ: 84 INJECTION, SOLUTION INTRAVENOUS at 21:32

## 2021-01-01 RX ADMIN — EPINEPHRINE 1 MG: 0.1 INJECTION, SOLUTION ENDOTRACHEAL; INTRACARDIAC; INTRAVENOUS at 22:08

## 2021-01-01 RX ADMIN — IOPAMIDOL 80 ML: 755 INJECTION, SOLUTION INTRAVENOUS at 06:56

## 2021-01-01 RX ADMIN — MIDAZOLAM HYDROCHLORIDE 2 MG: 1 INJECTION, SOLUTION INTRAMUSCULAR; INTRAVENOUS at 16:50

## 2021-01-01 RX ADMIN — POTASSIUM CHLORIDE 10 MEQ: 7.46 INJECTION, SOLUTION INTRAVENOUS at 19:40

## 2021-01-01 RX ADMIN — Medication 10 ML: at 10:56

## 2021-01-01 RX ADMIN — OXYCODONE HYDROCHLORIDE AND ACETAMINOPHEN 500 MG: 500 TABLET ORAL at 20:44

## 2021-01-01 RX ADMIN — SALINE NASAL SPRAY 2 SPRAY: 1.5 SOLUTION NASAL at 12:50

## 2021-01-01 RX ADMIN — EPINEPHRINE 20 MCG/MIN: 1 INJECTION, SOLUTION, CONCENTRATE INTRAVENOUS at 21:20

## 2021-01-01 RX ADMIN — SODIUM BICARBONATE 50 MEQ: 84 INJECTION, SOLUTION INTRAVENOUS at 21:46

## 2021-01-01 RX ADMIN — CALCIUM CHLORIDE 1000 MG: 100 INJECTION, SOLUTION INTRAVENOUS; INTRAVENTRICULAR at 21:41

## 2021-01-01 RX ADMIN — EPINEPHRINE 1 MG: 0.1 INJECTION, SOLUTION ENDOTRACHEAL; INTRACARDIAC; INTRAVENOUS at 21:09

## 2021-01-01 RX ADMIN — Medication 10 ML: at 09:19

## 2021-01-01 RX ADMIN — EPINEPHRINE 1 MG: 0.1 INJECTION, SOLUTION ENDOTRACHEAL; INTRACARDIAC; INTRAVENOUS at 21:01

## 2021-01-01 RX ADMIN — HYDROXYZINE HYDROCHLORIDE 25 MG: 10 TABLET, FILM COATED ORAL at 20:40

## 2021-01-01 RX ADMIN — Medication 2000 UNITS: at 10:52

## 2021-01-01 RX ADMIN — SODIUM CHLORIDE: 9 INJECTION, SOLUTION INTRAVENOUS at 23:22

## 2021-01-01 RX ADMIN — EPINEPHRINE 1 MG: 0.1 INJECTION, SOLUTION ENDOTRACHEAL; INTRACARDIAC; INTRAVENOUS at 21:45

## 2021-01-01 RX ADMIN — ZINC SULFATE 220 MG (50 MG) CAPSULE 50 MG: CAPSULE at 09:20

## 2021-01-01 RX ADMIN — FENTANYL CITRATE 100 MCG: 50 INJECTION, SOLUTION INTRAMUSCULAR; INTRAVENOUS at 17:08

## 2021-01-01 RX ADMIN — DEXMEDETOMIDINE HYDROCHLORIDE 0.4 MCG/KG/HR: 100 INJECTION, SOLUTION INTRAVENOUS at 19:26

## 2021-01-01 RX ADMIN — DEXAMETHASONE SODIUM PHOSPHATE 6 MG: 4 INJECTION, SOLUTION INTRAMUSCULAR; INTRAVENOUS at 10:53

## 2021-01-01 RX ADMIN — SODIUM CHLORIDE: 9 INJECTION, SOLUTION INTRAVENOUS at 10:09

## 2021-01-01 RX ADMIN — ENOXAPARIN SODIUM 30 MG: 30 INJECTION SUBCUTANEOUS at 20:46

## 2021-01-01 RX ADMIN — OXYCODONE HYDROCHLORIDE AND ACETAMINOPHEN 500 MG: 500 TABLET ORAL at 20:30

## 2021-01-01 RX ADMIN — NOREPINEPHRINE BITARTRATE 100 MCG/MIN: 1 INJECTION, SOLUTION, CONCENTRATE INTRAVENOUS at 21:10

## 2021-01-01 RX ADMIN — FENTANYL CITRATE 100 MCG: 50 INJECTION, SOLUTION INTRAMUSCULAR; INTRAVENOUS at 15:47

## 2021-01-01 RX ADMIN — MIDAZOLAM HYDROCHLORIDE 2 MG: 1 INJECTION, SOLUTION INTRAMUSCULAR; INTRAVENOUS at 16:31

## 2021-01-01 RX ADMIN — EPINEPHRINE 1 MG: 0.1 INJECTION, SOLUTION ENDOTRACHEAL; INTRACARDIAC; INTRAVENOUS at 21:51

## 2021-01-01 RX ADMIN — ZINC SULFATE 220 MG (50 MG) CAPSULE 50 MG: CAPSULE at 08:56

## 2021-01-01 RX ADMIN — MIDAZOLAM HYDROCHLORIDE 2 MG: 2 INJECTION, SOLUTION INTRAMUSCULAR; INTRAVENOUS at 16:50

## 2021-01-01 RX ADMIN — ZINC SULFATE 220 MG (50 MG) CAPSULE 50 MG: CAPSULE at 10:52

## 2021-01-01 RX ADMIN — EPINEPHRINE 1 MG: 0.1 INJECTION, SOLUTION ENDOTRACHEAL; INTRACARDIAC; INTRAVENOUS at 22:05

## 2021-01-01 RX ADMIN — EPINEPHRINE 1 MG: 0.1 INJECTION, SOLUTION ENDOTRACHEAL; INTRACARDIAC; INTRAVENOUS at 21:17

## 2021-01-01 RX ADMIN — REMDESIVIR 100 MG: 100 INJECTION, POWDER, LYOPHILIZED, FOR SOLUTION INTRAVENOUS at 09:19

## 2021-01-01 RX ADMIN — LORAZEPAM 0.5 MG: 2 INJECTION INTRAMUSCULAR; INTRAVENOUS at 10:54

## 2021-01-01 RX ADMIN — LORAZEPAM 0.5 MG: 2 INJECTION INTRAMUSCULAR; INTRAVENOUS at 20:46

## 2021-01-01 RX ADMIN — REMDESIVIR 100 MG: 100 INJECTION, POWDER, LYOPHILIZED, FOR SOLUTION INTRAVENOUS at 08:56

## 2021-01-01 RX ADMIN — TOCILIZUMAB 800 MG: 20 INJECTION, SOLUTION, CONCENTRATE INTRAVENOUS at 22:49

## 2021-01-01 RX ADMIN — SODIUM BICARBONATE 50 MEQ: 84 INJECTION, SOLUTION INTRAVENOUS at 21:10

## 2021-01-01 RX ADMIN — Medication 10 ML: at 20:44

## 2021-01-01 RX ADMIN — SODIUM CHLORIDE: 9 INJECTION, SOLUTION INTRAVENOUS at 16:57

## 2021-01-01 RX ADMIN — OXYCODONE HYDROCHLORIDE AND ACETAMINOPHEN 500 MG: 500 TABLET ORAL at 19:42

## 2021-01-01 RX ADMIN — INSULIN LISPRO 1 UNITS: 100 INJECTION, SOLUTION INTRAVENOUS; SUBCUTANEOUS at 19:00

## 2021-01-01 RX ADMIN — EPINEPHRINE 1 MG: 0.1 INJECTION, SOLUTION ENDOTRACHEAL; INTRACARDIAC; INTRAVENOUS at 21:22

## 2021-01-01 RX ADMIN — Medication 2000 UNITS: at 08:56

## 2021-01-01 RX ADMIN — OXYCODONE HYDROCHLORIDE AND ACETAMINOPHEN 500 MG: 500 TABLET ORAL at 08:56

## 2021-01-01 RX ADMIN — EPINEPHRINE 1 MG: 0.1 INJECTION, SOLUTION ENDOTRACHEAL; INTRACARDIAC; INTRAVENOUS at 21:59

## 2021-01-01 RX ADMIN — Medication 10 ML: at 10:10

## 2021-01-01 RX ADMIN — DEXAMETHASONE SODIUM PHOSPHATE 6 MG: 4 INJECTION, SOLUTION INTRAMUSCULAR; INTRAVENOUS at 09:19

## 2021-01-01 RX ADMIN — BENZONATATE 100 MG: 100 CAPSULE ORAL at 20:40

## 2021-01-01 RX ADMIN — DIPHENHYDRAMINE HCL 25 MG: 25 TABLET ORAL at 20:44

## 2021-01-01 RX ADMIN — ENOXAPARIN SODIUM 30 MG: 30 INJECTION SUBCUTANEOUS at 20:30

## 2021-01-01 RX ADMIN — DIPHENHYDRAMINE HYDROCHLORIDE 25 MG: 50 INJECTION, SOLUTION INTRAMUSCULAR; INTRAVENOUS at 10:52

## 2021-01-01 RX ADMIN — SODIUM CHLORIDE: 9 INJECTION, SOLUTION INTRAVENOUS at 20:30

## 2021-01-01 RX ADMIN — LORAZEPAM 0.5 MG: 2 INJECTION INTRAMUSCULAR; INTRAVENOUS at 06:28

## 2021-01-01 RX ADMIN — DEXAMETHASONE SODIUM PHOSPHATE 6 MG: 4 INJECTION, SOLUTION INTRAMUSCULAR; INTRAVENOUS at 08:56

## 2021-01-01 RX ADMIN — Medication 10 ML: at 20:30

## 2021-01-01 RX ADMIN — Medication 10 ML: at 08:56

## 2021-01-01 RX ADMIN — LORAZEPAM 0.5 MG: 2 INJECTION INTRAMUSCULAR; INTRAVENOUS at 12:49

## 2021-01-01 RX ADMIN — MIDAZOLAM HYDROCHLORIDE 2 MG: 2 INJECTION, SOLUTION INTRAMUSCULAR; INTRAVENOUS at 16:31

## 2021-01-01 RX ADMIN — SODIUM BICARBONATE 50 MEQ: 84 INJECTION, SOLUTION INTRAVENOUS at 21:21

## 2021-01-01 RX ADMIN — ENOXAPARIN SODIUM 40 MG: 40 INJECTION SUBCUTANEOUS at 09:29

## 2021-01-01 RX ADMIN — CALCIUM CARBONATE 500 MG: 500 TABLET, CHEWABLE ORAL at 23:14

## 2021-01-01 RX ADMIN — PANTOPRAZOLE SODIUM 40 MG: 40 INJECTION, POWDER, FOR SOLUTION INTRAVENOUS at 19:30

## 2021-01-01 RX ADMIN — Medication 10 ML: at 09:29

## 2021-01-01 RX ADMIN — DEXAMETHASONE SODIUM PHOSPHATE 6 MG: 4 INJECTION, SOLUTION INTRAMUSCULAR; INTRAVENOUS at 09:27

## 2021-01-01 RX ADMIN — SODIUM CHLORIDE: 9 INJECTION, SOLUTION INTRAVENOUS at 06:28

## 2021-01-01 RX ADMIN — DEXAMETHASONE SODIUM PHOSPHATE 6 MG: 4 INJECTION, SOLUTION INTRAMUSCULAR; INTRAVENOUS at 20:47

## 2021-01-01 RX ADMIN — MIDAZOLAM HYDROCHLORIDE 2 MG: 1 INJECTION, SOLUTION INTRAMUSCULAR; INTRAVENOUS at 17:31

## 2021-01-01 RX ADMIN — ACETAMINOPHEN 1000 MG: 500 TABLET ORAL at 07:53

## 2021-01-01 RX ADMIN — REMDESIVIR 100 MG: 100 INJECTION, POWDER, LYOPHILIZED, FOR SOLUTION INTRAVENOUS at 10:55

## 2021-01-01 RX ADMIN — LIDOCAINE HYDROCHLORIDE,EPINEPHRINE BITARTRATE 60 ML: 10; .01 INJECTION, SOLUTION INFILTRATION; PERINEURAL at 16:32

## 2021-01-01 RX ADMIN — EPINEPHRINE 1 MG: 0.1 INJECTION, SOLUTION ENDOTRACHEAL; INTRACARDIAC; INTRAVENOUS at 21:03

## 2021-01-01 RX ADMIN — ONDANSETRON 4 MG: 2 INJECTION INTRAMUSCULAR; INTRAVENOUS at 19:50

## 2021-01-01 RX ADMIN — DEXAMETHASONE SODIUM PHOSPHATE 6 MG: 4 INJECTION, SOLUTION INTRAMUSCULAR; INTRAVENOUS at 20:44

## 2021-01-01 RX ADMIN — EPINEPHRINE 1 MG: 0.1 INJECTION, SOLUTION ENDOTRACHEAL; INTRACARDIAC; INTRAVENOUS at 21:30

## 2021-01-01 RX ADMIN — SODIUM CHLORIDE 999 ML/HR: 9 INJECTION, SOLUTION INTRAVENOUS at 21:05

## 2021-01-01 RX ADMIN — ENOXAPARIN SODIUM 40 MG: 40 INJECTION SUBCUTANEOUS at 10:20

## 2021-01-01 RX ADMIN — ENOXAPARIN SODIUM 30 MG: 30 INJECTION SUBCUTANEOUS at 10:55

## 2021-01-01 RX ADMIN — SODIUM BICARBONATE 50 MEQ: 84 INJECTION, SOLUTION INTRAVENOUS at 21:01

## 2021-01-01 RX ADMIN — EPINEPHRINE 1 MG: 0.1 INJECTION, SOLUTION ENDOTRACHEAL; INTRACARDIAC; INTRAVENOUS at 21:35

## 2021-01-01 RX ADMIN — OXYCODONE HYDROCHLORIDE AND ACETAMINOPHEN 500 MG: 500 TABLET ORAL at 09:29

## 2021-01-01 RX ADMIN — SODIUM BICARBONATE 50 MEQ: 84 INJECTION, SOLUTION INTRAVENOUS at 21:04

## 2021-01-01 RX ADMIN — Medication 2000 UNITS: at 12:06

## 2021-01-01 RX ADMIN — SALINE NASAL SPRAY 2 SPRAY: 1.5 SOLUTION NASAL at 17:00

## 2021-01-01 RX ADMIN — ALBUTEROL SULFATE 2 PUFF: 90 AEROSOL, METERED RESPIRATORY (INHALATION) at 20:40

## 2021-01-01 RX ADMIN — Medication 2000 UNITS: at 09:29

## 2021-01-01 RX ADMIN — DEXAMETHASONE SODIUM PHOSPHATE 6 MG: 4 INJECTION, SOLUTION INTRAMUSCULAR; INTRAVENOUS at 20:30

## 2021-01-01 RX ADMIN — BENZONATATE 100 MG: 100 CAPSULE ORAL at 20:45

## 2021-01-01 RX ADMIN — OXYCODONE HYDROCHLORIDE AND ACETAMINOPHEN 500 MG: 500 TABLET ORAL at 10:52

## 2021-01-01 RX ADMIN — SODIUM CHLORIDE, PRESERVATIVE FREE 10 ML: 5 INJECTION INTRAVENOUS at 13:52

## 2021-01-01 RX ADMIN — SODIUM CHLORIDE: 9 INJECTION, SOLUTION INTRAVENOUS at 19:41

## 2021-01-01 RX ADMIN — LORAZEPAM 0.5 MG: 2 INJECTION INTRAMUSCULAR; INTRAVENOUS at 20:30

## 2021-01-01 RX ADMIN — ALBUTEROL SULFATE 2 PUFF: 90 AEROSOL, METERED RESPIRATORY (INHALATION) at 20:47

## 2021-01-01 RX ADMIN — EPINEPHRINE 1 MG: 0.1 INJECTION, SOLUTION ENDOTRACHEAL; INTRACARDIAC; INTRAVENOUS at 21:16

## 2021-01-01 RX ADMIN — Medication 10 ML: at 20:46

## 2021-01-01 RX ADMIN — FUROSEMIDE 20 MG: 10 INJECTION, SOLUTION INTRAMUSCULAR; INTRAVENOUS at 15:31

## 2021-01-01 RX ADMIN — DOPAMINE HYDROCHLORIDE 20 MCG/KG/MIN: 320 INJECTION, SOLUTION INTRAVENOUS at 22:01

## 2021-01-01 RX ADMIN — DEXAMETHASONE SODIUM PHOSPHATE 6 MG: 10 INJECTION, SOLUTION INTRAMUSCULAR; INTRAVENOUS at 05:53

## 2021-01-01 RX ADMIN — ZINC SULFATE 220 MG (50 MG) CAPSULE 50 MG: CAPSULE at 12:06

## 2021-01-01 RX ADMIN — SODIUM BICARBONATE 50 MEQ: 84 INJECTION, SOLUTION INTRAVENOUS at 21:52

## 2021-01-01 RX ADMIN — BENZONATATE 100 MG: 100 CAPSULE ORAL at 09:20

## 2021-01-01 RX ADMIN — FUROSEMIDE 40 MG: 10 INJECTION, SOLUTION INTRAMUSCULAR; INTRAVENOUS at 19:30

## 2021-01-01 RX ADMIN — ZINC SULFATE 220 MG (50 MG) CAPSULE 50 MG: CAPSULE at 09:29

## 2021-01-01 RX ADMIN — LIDOCAINE HYDROCHLORIDE 200 MG: 10 INJECTION, SOLUTION INFILTRATION; PERINEURAL at 16:38

## 2021-01-01 RX ADMIN — SODIUM CHLORIDE 1000 ML: 9 INJECTION, SOLUTION INTRAVENOUS at 07:53

## 2021-01-01 RX ADMIN — DEXAMETHASONE SODIUM PHOSPHATE 6 MG: 4 INJECTION, SOLUTION INTRAMUSCULAR; INTRAVENOUS at 19:50

## 2021-01-01 RX ADMIN — Medication 2000 UNITS: at 09:19

## 2021-01-01 RX ADMIN — REMDESIVIR 200 MG: 100 INJECTION, POWDER, LYOPHILIZED, FOR SOLUTION INTRAVENOUS at 10:33

## 2021-01-01 RX ADMIN — BENZONATATE 100 MG: 100 CAPSULE ORAL at 20:44

## 2021-01-01 RX ADMIN — OXYCODONE HYDROCHLORIDE AND ACETAMINOPHEN 500 MG: 500 TABLET ORAL at 20:45

## 2021-01-01 RX ADMIN — LORAZEPAM 0.5 MG: 2 INJECTION INTRAMUSCULAR; INTRAVENOUS at 13:51

## 2021-01-01 RX ADMIN — LORAZEPAM 0.5 MG: 2 INJECTION INTRAMUSCULAR; INTRAVENOUS at 02:58

## 2021-01-01 RX ADMIN — MIDAZOLAM HYDROCHLORIDE 2 MG: 2 INJECTION, SOLUTION INTRAMUSCULAR; INTRAVENOUS at 17:31

## 2021-01-01 RX ADMIN — ENOXAPARIN SODIUM 40 MG: 40 INJECTION SUBCUTANEOUS at 09:20

## 2021-01-01 RX ADMIN — LORAZEPAM 0.5 MG: 2 INJECTION INTRAMUSCULAR; INTRAVENOUS at 03:04

## 2021-01-01 ASSESSMENT — PAIN SCALES - GENERAL
PAINLEVEL_OUTOF10: 5
PAINLEVEL_OUTOF10: 0
PAINLEVEL_OUTOF10: 6
PAINLEVEL_OUTOF10: 0

## 2021-01-28 ENCOUNTER — HOSPITAL ENCOUNTER (OUTPATIENT)
Dept: HOSPITAL 83 - COVID19 | Age: 36
Discharge: HOME | End: 2021-01-28
Attending: INTERNAL MEDICINE
Payer: COMMERCIAL

## 2021-01-28 DIAGNOSIS — Z20.822: Primary | ICD-10-CM

## 2021-05-16 ENCOUNTER — HOSPITAL ENCOUNTER (EMERGENCY)
Dept: HOSPITAL 83 - ED | Age: 36
Discharge: HOME | End: 2021-05-16
Payer: COMMERCIAL

## 2021-05-16 VITALS — HEIGHT: 60 IN

## 2021-05-16 DIAGNOSIS — Z88.2: ICD-10-CM

## 2021-05-16 DIAGNOSIS — Z79.899: ICD-10-CM

## 2021-05-16 DIAGNOSIS — U07.1: Primary | ICD-10-CM

## 2021-05-16 DIAGNOSIS — Z79.2: ICD-10-CM

## 2021-05-17 ENCOUNTER — HOSPITAL ENCOUNTER (EMERGENCY)
Dept: HOSPITAL 83 - ED | Age: 36
Discharge: HOME | End: 2021-05-17
Payer: COMMERCIAL

## 2021-05-17 VITALS — HEIGHT: 60 IN

## 2021-05-17 DIAGNOSIS — U07.1: Primary | ICD-10-CM

## 2021-05-17 DIAGNOSIS — R11.2: ICD-10-CM

## 2021-05-17 DIAGNOSIS — Z79.2: ICD-10-CM

## 2021-05-17 DIAGNOSIS — Z88.2: ICD-10-CM

## 2021-05-17 DIAGNOSIS — Z79.899: ICD-10-CM

## 2021-05-17 LAB
ALBUMIN SERPL-MCNC: 3.6 GM/DL (ref 3.1–4.5)
ALP SERPL-CCNC: 92 U/L (ref 45–117)
ALT SERPL W P-5'-P-CCNC: 69 U/L (ref 12–78)
AST SERPL-CCNC: 34 IU/L (ref 3–35)
BASOPHILS # BLD AUTO: 0 10*3/UL (ref 0–0.1)
BASOPHILS NFR BLD AUTO: 0.2 % (ref 0–1)
BUN SERPL-MCNC: 17 MG/DL (ref 7–24)
CHLORIDE SERPL-SCNC: 101 MMOL/L (ref 98–107)
CREAT SERPL-MCNC: 1.3 MG/DL (ref 0.7–1.3)
EOSINOPHIL # BLD AUTO: 0 % (ref 1–4)
EOSINOPHIL # BLD AUTO: 0 10*3/UL (ref 0–0.4)
ERYTHROCYTE [DISTWIDTH] IN BLOOD BY AUTOMATED COUNT: 12.1 % (ref 0–14.5)
HCT VFR BLD AUTO: 41.8 % (ref 42–52)
LIPASE SERPL-CCNC: 82 U/L (ref 73–393)
LYMPHOCYTES # BLD AUTO: 0.7 10*3/UL (ref 1.3–4.4)
LYMPHOCYTES NFR BLD AUTO: 14.4 % (ref 27–41)
MCH RBC QN AUTO: 28.6 PG (ref 27–31)
MCHC RBC AUTO-ENTMCNC: 32.8 G/DL (ref 33–37)
MCV RBC AUTO: 87.3 FL (ref 80–94)
MONOCYTES # BLD AUTO: 0.5 10*3/UL (ref 0.1–1)
MONOCYTES NFR BLD MANUAL: 9.2 % (ref 3–9)
NEUT #: 3.7 10*3/UL (ref 2.3–7.9)
NEUT %: 75 % (ref 47–73)
NRBC BLD QL AUTO: 0 % (ref 0–0)
PLATELET # BLD AUTO: 238 10*3/UL (ref 130–400)
PMV BLD AUTO: 9.4 FL (ref 9.6–12.3)
POTASSIUM SERPL-SCNC: 4.2 MMOL/L (ref 3.5–5.1)
PROT SERPL-MCNC: 7.9 GM/DL (ref 6.4–8.2)
RBC # BLD AUTO: 4.79 10*6/UL (ref 4.5–5.9)
SODIUM SERPL-SCNC: 136 MMOL/L (ref 136–145)
WBC NRBC COR # BLD AUTO: 4.9 10*3/UL (ref 4.8–10.8)

## 2021-05-23 PROBLEM — U07.1 ACUTE RESPIRATORY FAILURE DUE TO COVID-19 (HCC): Status: ACTIVE | Noted: 2021-01-01

## 2021-05-23 PROBLEM — J96.00 ACUTE RESPIRATORY FAILURE DUE TO COVID-19 (HCC): Status: ACTIVE | Noted: 2021-01-01

## 2021-05-23 NOTE — ED PROVIDER NOTES
HPI:  5/23/21,   Time: 6:15 AM EDT       Jaren Mcgarry is a 43 y.o. male presenting to the ED for shortness of breath, beginning 1 week ago. The complaint has been persistent, moderate in severity, and worsened by walking. Patient is a pleasant 53-year-old gentleman who presents emergency room stating that he was diagnosed with Covid 1 week ago. He developed body aches mild headache dry cough occasional fevers nausea and vomiting. He was diagnosed with a positive test 1 week ago. He states over the course the week his shortness of breath increase especially with walking. He presented to [facility] 3 days ago he was admitted overnight. He was started on Decadron and 2 L of oxygen was discharged on 2 L of oxygen. He states despite that over the past 2 days, he continues to be short of breath but markedly so with ambulating. Feels better at rest.  He feels tightness in his chest but no chest pain no pleuritic pain. No fevers or chills the last 2 days. Cough continues to be dry. Patient is fatigued. Patient denies any abdominal pain or headache at this time. Patient presented with oxygen levels in the 70s with ambulating on his baseline 2 L he is now on 6 L nasal cannula maintaining saturations in the mid 90s. Review of Systems:   Pertinent positives and negatives are stated within HPI, all other systems reviewed and are negative.          --------------------------------------------- PAST HISTORY ---------------------------------------------  Past Medical History:  has no past medical history on file. Past Surgical History:  has no past surgical history on file. Social History:  reports that he has never smoked. He has never used smokeless tobacco.    Family History: family history is not on file. The patients home medications have been reviewed.     Allergies: Sulfa antibiotics        ---------------------------------------------------PHYSICAL EXAM--------------------------------------    Constitutional/General: Alert and oriented x3, speak in moderate length sentences; non toxic in NAD; overweight  Head: Normocephalic and atraumatic  Eyes: PERRL, EOMI, conjunctive normal, sclera non icteric  Mouth: Oropharynx clear, handling secretions, no trismus, no asymmetry of the posterior oropharynx or uvular edema  Neck: Supple, full ROM, non tender to palpation in the midline, no stridor, no crepitus, no meningeal signs  Respiratory: Fair amount bilaterally scant expiratory wheezes. No rhonchi or rails. Mild tachypnea. No accessory muscle use or retractions. He is able to speak in moderate length sentences  Cardiovascular:  Regular rate. Regular rhythm. No murmurs, gallops, or rubs. 2+ distal pulses  Chest: No chest wall tenderness  GI:  Abdomen Soft, Non tender, Non distended. +BS. No organomegaly, no palpable masses,  No rebound, guarding, or rigidity. Musculoskeletal: Moves all extremities x 4. Warm and well perfused, no clubbing, cyanosis, or edema. Capillary refill <3 seconds  Integument: skin warm and dry. No rashes. Lymphatic: no lymphadenopathy noted  Neurologic: GCS 15, no focal deficits, symmetric strength 5/5 in the upper and lower extremities bilaterally  Psychiatric: Normal Affect    -------------------------------------------------- RESULTS -------------------------------------------------  I have personally reviewed all laboratory and imaging results for this patient. Results are listed below.      LABS:  Results for orders placed or performed during the hospital encounter of 05/23/21   Comprehensive Metabolic Panel w/ Reflex to MG   Result Value Ref Range    Sodium 139 132 - 146 mmol/L    Potassium reflex Magnesium 4.5 3.5 - 5.0 mmol/L    Chloride 100 98 - 107 mmol/L    CO2 26 22 - 29 mmol/L    Anion Gap 13 7 - 16 mmol/L    Glucose 157 (H) 74 - 99 mg/dL    BUN 20 6 - 20 mg/dL    CREATININE 1.1 0.7 - 1.2 mg/dL    GFR Non-African American >60 >=60 mL/min/1.73    GFR African American >60     Calcium 9.4 8.6 - 10.2 mg/dL    Total Protein 7.9 6.4 - 8.3 g/dL    Albumin 4.1 3.5 - 5.2 g/dL    Total Bilirubin 0.6 0.0 - 1.2 mg/dL    Alkaline Phosphatase 79 40 - 129 U/L    ALT 35 0 - 40 U/L    AST 37 0 - 39 U/L   CBC Auto Differential   Result Value Ref Range    WBC 5.8 4.5 - 11.5 E9/L    RBC 5.48 3.80 - 5.80 E12/L    Hemoglobin 15.5 12.5 - 16.5 g/dL    Hematocrit 47.6 37.0 - 54.0 %    MCV 86.9 80.0 - 99.9 fL    MCH 28.3 26.0 - 35.0 pg    MCHC 32.6 32.0 - 34.5 %    RDW 12.7 11.5 - 15.0 fL    Platelets 235 628 - 461 E9/L    MPV 9.4 7.0 - 12.0 fL    Neutrophils % 75.0 43.0 - 80.0 %    Immature Granulocytes % 1.4 0.0 - 5.0 %    Lymphocytes % 16.1 (L) 20.0 - 42.0 %    Monocytes % 7.3 2.0 - 12.0 %    Eosinophils % 0.0 0.0 - 6.0 %    Basophils % 0.2 0.0 - 2.0 %    Neutrophils Absolute 4.34 1.80 - 7.30 E9/L    Immature Granulocytes # 0.08 E9/L    Lymphocytes Absolute 0.93 (L) 1.50 - 4.00 E9/L    Monocytes Absolute 0.42 0.10 - 0.95 E9/L    Eosinophils Absolute 0.00 (L) 0.05 - 0.50 E9/L    Basophils Absolute 0.01 0.00 - 0.20 E9/L   Troponin   Result Value Ref Range    Troponin <0.01 0.00 - 0.03 ng/mL   Brain Natriuretic Peptide   Result Value Ref Range    Pro-BNP 53 0 - 125 pg/mL   Lactic Acid, Plasma   Result Value Ref Range    Lactic Acid 2.8 (H) 0.5 - 2.2 mmol/L   EKG 12 Lead   Result Value Ref Range    Ventricular Rate 92 BPM    Atrial Rate 92 BPM    P-R Interval 130 ms    QRS Duration 96 ms    Q-T Interval 340 ms    QTc Calculation (Bazett) 420 ms    P Axis 33 degrees    R Axis 4 degrees    T Axis 10 degrees       RADIOLOGY:  Interpreted by Radiologist.  CTA PULMONARY W CONTRAST   Final Result   No pulmonary embolism. Cardiomegaly. Bilateral parenchymal infiltrates which likely represent pneumonia, given the   patient's reported history of viral infection. Continued follow-up   recommended.          XR CHEST PORTABLE   Final Result   Diminished lung remdesivir 100 mg in sodium chloride 0.9 % 250 mL IVPB (has no administration in time range)   0.9 % sodium chloride bolus (has no administration in time range)   dexamethasone (PF) (DECADRON) injection 6 mg (6 mg Intravenous Given 5/23/21 0553)   iopamidol (ISOVUE-370) 76 % injection 80 mL (80 mLs Intravenous Given 5/23/21 0656)   0.9 % sodium chloride bolus (1,000 mLs Intravenous New Bag 5/23/21 0753)   acetaminophen (TYLENOL) tablet 1,000 mg (1,000 mg Oral Given 5/23/21 0753)         ED COURSE:  ED Course as of May 23 0919   Sun May 23, 2021   0750 I spoke to Dr. Wm Elizalde who admit to the hospitalist service. [QQ]   6708 His oxygen level had to be increased to 8 L nasal cannula. Patient's were still about 88% on room air at rest.  Decision was made to place to place the patient on high flow oxygen. Patient will be admitted to intermediate care. [KK]      ED Course User Index  [KK] Ahsan Hicks MD       Medical Decision Making:    Patient is 25 of gentleman diagnosed with Covid 1 week ago recent hospitalization is [facility] started on oxygen and sent home on 2 L. Is on oral Decadron 6 mg orally today as well. He continues have increasing desaturations with any ambulation and is requiring 6 L of oxygen at this time we will do a CTA to rule out PE supportive care he does well at rest but with any ambulation is markedly desats. Patient will need to be admitted. Differential diagnosis Covid exacerbation hypoxia acute respiratory failure PE ACS dysrhythmia ZACKARY      Patient EKG showed normal sinus rhythm at 92 beats minute no signs of any ST changes. Patient had a CBC that was normal and a chemistry that was normal, as well. Troponin was negative BNP was normal lactic acid was 2.8. Is given IV a liter of IV fluids. CTA of the chest did not show any pulmonary embolism. Just infiltrates consistent with a viral infection.   Chest x-ray showed some diminished lung volumes infiltrates consistent with Covid. Patient is known Covid positive. Initially he was placed on 6 L nasal cannula and had improvement of saturations. He then began to decrease his saturations into the upper 80s he was placed on high flow nasal cannula. I spoke to the hospitalist agreed admit the patient to their service pulmonary and ID consult in house. Patient stable respiratory status respirations around 24-26 at rest.  Able to speak in moderate length sentences. No accessory muscle use or retractions no need for BiPAP at this time. Patient doing well on high flow. With any exertion he becomes markedly more tachypneic so bedrest is recommended at this time. Patient be admitted to intermediate care monitored bed. This patient closely monitored during their ED course. Re-Evaluations:             Re-evaluation. Patients symptoms proved initially with 6 L nasal cannula and then had to be transitioned to high flow nasal cannula. Re-examination  5/23/21   6:15 AM EDT          Vital Signs:   Vitals:    05/23/21 0615 05/23/21 0705 05/23/21 0802 05/23/21 0845   BP: (!) 141/76 (!) 150/72 (!) 143/64 132/68   Pulse: 86 93 84 80   Resp: 24  20 (!) 32   Temp:   99.9 °F (37.7 °C) 100.7 °F (38.2 °C)   TempSrc:   Oral Oral   SpO2: 96% 95% 92% 95%   Weight:       Height:                 CRITICAL CARE:   42 MINUTES. Please note that the withdrawal or failure to initiate urgent interventions for this patient would likely result in a life threatening deterioration or permanent disability. Accordingly this patient received the above mentioned time, excluding separately billable procedures. Counseling: The emergency provider has spoken with the patient and discussed todays results, in addition to providing specific details for the plan of care and counseling regarding the diagnosis and prognosis.   Questions are answered at this time and they are agreeable with the plan.       ---------------------------------

## 2021-05-23 NOTE — CONSULTS
(Non-Medical):    Physical Activity:     Days of Exercise per Week:     Minutes of Exercise per Session:    Stress:     Feeling of Stress :    Social Connections:     Frequency of Communication with Friends and Family:     Frequency of Social Gatherings with Friends and Family:     Attends Cheondoism Services:     Active Member of Clubs or Organizations:     Attends Club or Organization Meetings:     Marital Status:    Intimate Partner Violence:     Fear of Current or Ex-Partner:     Emotionally Abused:     Physically Abused:     Sexually Abused: Works as a technician for [employer] History:  History reviewed. No pertinent family history. Parents, siblings and children all alive and well. Medications:     sodium chloride      sodium chloride 100 mL/hr at 21 1009      sodium chloride flush  5-40 mL Intravenous 2 times per day    enoxaparin  40 mg Subcutaneous Daily    dexamethasone  6 mg Intravenous Q12H    [START ON 2021] remdesivir IVPB  100 mg Intravenous Q24H         Vitals:    VITALS:  /68   Pulse 80   Temp 100.7 °F (38.2 °C) (Oral)   Resp 28   Ht 6' 1\" (1.854 m)   Wt (!) 304 lb (137.9 kg)   SpO2 95%   BMI 40.11 kg/m²   24HR INTAKE/OUTPUT:  No intake or output data in the 24 hours ending 21 1115  CURRENT PULSE OXIMETRY:  SpO2: 95 %  24HR PULSE OXIMETRY RANGE:  SpO2  Av.3 %  Min: 76 %  Max: 96 %      EXAM:  General: No distress. Eyes: PERRL. No sclera icterus. No conjunctival injection. ENT: No discharge. Pharynx clear. Neck: Trachea midline. Normal thyroid. Resp: No accessory muscle use. No crackles. No wheezing. No rhonchi. CV: Regular rate. Regular rhythm. No mumur or rub. ABD: Non-tender. Non-distended. No masses. No organmegaly. Normal bowel sounds. Skin: Warm and dry. No nodule on exposed extremities. No rash on exposed extremities. Lymph: No cervical LAD. No supraclavicular LAD. Ext: No joint deformity. No clubbing.  No Will check Ferritin, fibrinogen, CRP, LDH. · Will check procalcitonin  · Low dose Lovenox  · Will add, zinc, vit C, vit D  · IS  · Prone as able      Thank you for allowing us to see this patient in consultation. Please contact us with any questions.     WILLIS Hewitt    Electronically signed by FAWAD Hewitt CNP on 5/23/2021 at 11:15 AM

## 2021-05-23 NOTE — H&P
[place] [id number] [facility]   History and Physical      CHIEF COMPLAINT:  Shortness of breath    History of Present Illness: pt is a 43 y.o. male who presents for sob. Pt had first had symptoms last Sunday which were more generalized such as n/v, aches, fevers, chills. Pt's wife was positive at that time and on that Sunday was checked and found positive for covid. Pt started to have sob and cough on Thursday. Pt was admitted to another hospital and was given steroid and iv fluids then discharged on oxygen the next day. Pt continued to have sob and cough and progressively worsening. Pt presented to ER on 2liters o2 and was in the 70's. Pt denies diarrhea, constipation, abd pain, melena, hematochezia, change in urination, dysuria, or hematuria. REVIEW OF SYSTEMS:    A detailed system review was conducted with patient and is negative unless stated in hpi. PMH:  History reviewed. No pertinent past medical history. Surgical History:  History reviewed. No pertinent surgical history. Medications Prior to Admission:    Prior to Admission medications    Not on File       Allergies:    Sulfa antibiotics    Social History:    reports that he has never smoked. He has never used smokeless tobacco. He reports previous alcohol use. He reports that he does not use drugs. Family History: reviewed with pt and pt denied hx when asked  History reviewed. No pertinent family history. PHYSICAL EXAM:    Vitals:  /77   Pulse 73   Temp 98.8 °F (37.1 °C) (Oral)   Resp 28   Ht 6' 1\" (1.854 m)   Wt (!) 304 lb (137.9 kg)   SpO2 94%   BMI 40.11 kg/m²     General:  Appears mildly uncomfortable. Pt with flushed face and appears tired. Answers questions appropriately and cooperative with exam  HEENT:  Mucous membranes moist. No erythema, rhinorrhea, or post-nasal drip noted. Neck:  No carotid bruits. Heart:  Rhythm regular at rate of 76  Lungs:  CTA.   No wheeze, rales, or Component Value Date    PH 7.410 05/23/2021    PCO2 35.8 05/23/2021    PO2 83.8 05/23/2021    HCO3 22.2 05/23/2021    BE -1.8 05/23/2021    O2SAT 96.6 05/23/2021       Radiology: XR CHEST PORTABLE    Result Date: 5/23/2021  EXAMINATION: ONE XRAY VIEW OF THE CHEST 5/23/2021 6:12 am COMPARISON: None. HISTORY: ORDERING SYSTEM PROVIDED HISTORY: SOB TECHNOLOGIST PROVIDED HISTORY: Reason for exam:->SOB FINDINGS: EKG leads overlie the chest.  Lung volumes are diminished. Cardiac silhouette is enlarged. No pneumothorax. Moderate diffuse bilateral infiltrates. No obvious effusion. Diminished lung volume. Enlarged cardiac silhouette. Bilateral infiltrates. Findings may reflect edema or multifocal pneumonia. Continued follow-up recommended. CTA PULMONARY W CONTRAST    Result Date: 5/23/2021  EXAMINATION: CTA OF THE CHEST 5/23/2021 6:57 am TECHNIQUE: CTA of the chest was performed after the administration of intravenous contrast.  Multiplanar reformatted images are provided for review. MIP images are provided for review. Dose modulation, iterative reconstruction, and/or weight based adjustment of the mA/kV was utilized to reduce the radiation dose to as low as reasonably achievable. COMPARISON: None. HISTORY: ORDERING SYSTEM PROVIDED HISTORY: r/o PE; +COVID TECHNOLOGIST PROVIDED HISTORY: Reason for exam:->r/o PE; +COVID Decision Support Exception - unselect if not a suspected or confirmed emergency medical condition->Emergency Medical Condition (MA) FINDINGS: No pulmonary embolism or aortic dissection. Normal-size lymph nodes without adenopathy by size criteria. Mild cardiomegaly. No pleural or pericardial effusion. Diffuse hepatic fatty infiltration. Visualized portions of the upper abdomen otherwise demonstrate no acute abnormality. No pneumothorax. There are fairly extensive ground-glass and patchy airspace infiltrates involving the right greater than left lung diffusely. No pulmonary embolism. Cardiomegaly. Bilateral parenchymal infiltrates which likely represent pneumonia, given the patient's reported history of viral infection. Continued follow-up recommended. ASSESSMENT:      Active Problems:    Acute respiratory failure due to COVID-19 [facility])  Resolved Problems:    * No resolved hospital problems. *      PLAN:    1. Acute respiratory failure with hypoxia(70'so2 sat)POA  Discussed with pulmonary  Adjust o2 as needed  2. Pneumonia due to covid 19 steroids and remdesivir. Discussed with pulmonary about possible toci for pt. 3. Dehydration iv fluids  4. Elevated lactic acid level monitor  5. Obesity (bmi40.11) rec wt loss  6. Hyperglycemia likely due to stress/steroids    Monitor inflammatory markers. Monitor pt closely for further decompensation.  D/w nursing        Electronically signed by Asaf Caballero DO on 5/23/2021 at 6:23 PM

## 2021-05-23 NOTE — PROGRESS NOTES
Pt arrived to floor A+OX4 c/o abd discomfort. Assess done resp easy unlabored, abd soft round + bs, + pulses no edema noted. pt on 13 L nc will monitor. Skin intact no open areas no rashes, eczema noted and bilat  Ankle.

## 2021-05-24 NOTE — MANAGEMENT PLAN
Patient now requires high flow oxygen and has deteriorated with increasing O2 requirements  Will prescribe tocilizumab.

## 2021-05-24 NOTE — PROGRESS NOTES
Pulmonary Progress Note    Admit Date: 2021  Requesting Physician: Michell Dumont RN    SUBJECTIVE:  Rough night - did not sleep  O2 requirements increased and now on Optiflow at 55L, 65%  Does not really feel dyspneic, occ cough but Optiflow makes him gag when he lies down to try to sleep        PMH:  History reviewed. No pertinent past medical history. PSH:  History reviewed. No pertinent surgical history. Mole removal 3 weeks ago. Respiratory ROS: Dyspnea, chills, fevers, achy, n/v, cough. Otherwise, a complete review of systems is undertaken and is negative. Medications:     sodium chloride      sodium chloride 100 mL/hr at 21 1009      sodium chloride flush  5-40 mL Intravenous 2 times per day    enoxaparin  40 mg Subcutaneous Daily    dexamethasone  6 mg Intravenous Q12H    remdesivir IVPB  100 mg Intravenous Q24H    zinc sulfate  50 mg Oral Daily    ascorbic acid  500 mg Oral BID    vitamin D  2,000 Units Oral Daily         Vitals:    VITALS:  BP (!) 155/86   Pulse 61   Temp 98.2 °F (36.8 °C) (Oral)   Resp 24   Ht 6' 1\" (1.854 m)   Wt (!) 304 lb (137.9 kg)   SpO2 93%   BMI 40.11 kg/m²   24HR INTAKE/OUTPUT:      Intake/Output Summary (Last 24 hours) at 2021 1107  Last data filed at 2021 0700  Gross per 24 hour   Intake --   Output 600 ml   Net -600 ml     CURRENT PULSE OXIMETRY:  SpO2: 93 %  24HR PULSE OXIMETRY RANGE:  SpO2  Av.2 %  Min: 87 %  Max: 96 %      EXAM:  General: No distress. Eyes: PERRL. No sclera icterus. No conjunctival injection. ENT: No discharge. Pharynx clear. Neck: Trachea midline. Normal thyroid. Resp: No accessory muscle use. Diminished with faint crackles L. No wheezing. No rhonchi. CV: Regular rate. Regular rhythm. No mumur or rub. ABD: Non-tender. Non-distended. No masses. No organmegaly. Normal bowel sounds. Skin: Warm and dry. No nodule on exposed extremities. No rash on exposed extremities. Lymph: No cervical LAD.  No supraclavicular LAD. Ext: No joint deformity. No clubbing. No cyanosis. No edema  Neuro: Awake. Follows commands      Lab Results:  CBC:   Recent Labs     05/23/21  0553 05/24/21 0312   WBC 5.8 4.1*   HGB 15.5 13.7   HCT 47.6 40.9   MCV 86.9 86.1    218     BMP:   Recent Labs     05/23/21  0553 05/24/21 0312    139   K 4.5 4.6    105   CO2 26 22   PHOS  --  3.8   BUN 20 17   CREATININE 1.1 0.9     LFT:   Recent Labs     05/23/21  0553 05/24/21 0312   ALKPHOS 79 67   ALT 35 31   AST 37 35   PROT 7.9 6.9   BILITOT 0.6 0.5   LABALBU 4.1 3.5     PT/INR:   Recent Labs     05/23/21  1010   PROTIME 11.8   INR 1.1       Cultures:  No results for input(s): CULTRESP in the last 72 hours. ABG:   Recent Labs     05/23/21  1039   PH 7.410   PO2 83.8   PCO2 35.8   HCO3 22.2   BE -1.8   O2SAT 96.6       Films:  XR CHEST PORTABLE 05/23/21:  Lung volumes are diminished. Cardiac silhouette is enlarged. No pneumothorax. Moderate diffuse bilateral infiltrates. No obvious effusion. Diminished lung volume. Enlarged cardiac silhouette. Bilateral infiltrates. Findings may reflect edema or multifocal pneumonia. Continued follow-up recommended. CTA PULMONARY W CONTRAST 5/23/2021: No pulmonary embolism or aortic dissection. Normal-size lymph nodes without adenopathy by size criteria. Mild cardiomegaly. No pleural or pericardial effusion. Diffuse hepatic fatty infiltration. Visualized portions of the upper abdomen otherwise demonstrate no acute abnormality. No pneumothorax. There are fairly extensive ground-glass and patchy airspace infiltrates involving the right greater than left lung diffusely. No pulmonary embolism. Cardiomegaly. Bilateral parenchymal infiltrates which likely represent pneumonia, given the patient's reported history of viral infection. Continued follow-up recommended.          Assessment:  · COVID pneumonia  · Acute hypoxic respiratory failure secondary to COVID pneumonia      Plan:  · Now on Optiflow at 55L, 65%, wean to keep pox >88%  · Received 1 dose of Toci yesterday  · Continue Remdesivir, day #2  · Continue Dexamethasone IV Q6 for now with high O2 requirements  · Follow inflammatory markers: D Dimer 333, Ferritin 1677, fibrinogen, CRP 6.7-->12.2 today, . · Procalcitonin 0.20  · Low dose Lovenox  · Will add, zinc, vit C, vit D  · IS  · Prone as able      WILLIS Siddiqui    Electronically signed by FAWAD Siddiqui CNP on 5/24/2021 at 11:07 AM    Attending Attestation Note:    Patient seen and examined with NP. I agree with above.     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

## 2021-05-24 NOTE — PROGRESS NOTES
Call placed to Dr. Fidela Cogan regarding patient complaint of anxiety, cough, and chest tightness from coughing. See orders.

## 2021-05-24 NOTE — PROGRESS NOTES
Call was placed to Pulmonology to update on oxygen demand increase (was on 3L early this am, now on 60L @70%), as well as per Dr. Issac Carrero request, to see if they would reconsider Toci administration. Spoke to Dr. Ish Lomax, see order for repeat CRP. Called lab after specimen was drawn to make sure they received it. Per Christy in lab, specimen is a sendout to [facility], and this likely won't be picked up until the morning (limited couriers on the weekend).

## 2021-05-24 NOTE — PROGRESS NOTES
[facility]isel[facility] Hospitalist   Progress Note    Admitting Date and Time: 5/23/2021  5:42 AM  Admit Dx: Acute respiratory failure due to COVID-19 ([facility] 75.) [U07.1, J96.00]    Subjective:    Patient was admitted with Acute respiratory failure due to COVID-19 ([facility] [U07.1, J96.00].  Patient denies fever, chills, cp, sob, n/v. Pt c/o unable to sleep     sodium chloride flush  5-40 mL Intravenous 2 times per day    enoxaparin  40 mg Subcutaneous Daily    dexamethasone  6 mg Intravenous Q12H    remdesivir IVPB  100 mg Intravenous Q24H    zinc sulfate  50 mg Oral Daily    ascorbic acid  500 mg Oral BID    vitamin D  2,000 Units Oral Daily     sodium chloride flush, 5-40 mL, PRN  sodium chloride, 25 mL, PRN  promethazine, 12.5 mg, Q6H PRN   Or  ondansetron, 4 mg, Q6H PRN  polyethylene glycol, 17 g, Daily PRN  acetaminophen, 650 mg, Q6H PRN   Or  acetaminophen, 650 mg, Q6H PRN  sodium chloride, 30 mL, PRN  albuterol sulfate HFA, 2 puff, Q6H PRN  benzonatate, 100 mg, TID PRN         Objective:    BP (!) 143/83   Pulse 71   Temp 98.4 °F (36.9 °C) (Oral)   Resp 24   Ht 6' 1\" (1.854 m)   Wt (!) 304 lb (137.9 kg)   SpO2 (!) 88%   BMI 40.11 kg/m²   Skin: warm and dry, no rash or erythema  Pulmonary/Chest: clear to auscultation bilaterally- no wheezes, rales or rhonchi, normal air movement, no respiratory distress  Cardiovascular: rhythm reg at rate of 72  Abdomen: soft, non-tender, non-distended, normal bowel sounds, no masses or organomegaly  Extremities: no cyanosis, no clubbing and no edema      Recent Labs     05/23/21  0553 05/24/21  0312    139   K 4.5 4.6    105   CO2 26 22   BUN 20 17   CREATININE 1.1 0.9   GLUCOSE 157* 184*   CALCIUM 9.4 8.6       Recent Labs     05/23/21  0553 05/24/21  0312   WBC 5.8 4.1*   RBC 5.48 4.75   HGB 15.5 13.7   HCT 47.6 40.9   MCV 86.9 86.1   MCH 28.3 28.8   MCHC 32.6 33.5   RDW 12.7 12.6    218   MPV 9.4 9.4       CBC with Differential:    Lab Results Component Value Date    WBC 4.1 05/24/2021    RBC 4.75 05/24/2021    HGB 13.7 05/24/2021    HCT 40.9 05/24/2021     05/24/2021    MCV 86.1 05/24/2021    MCH 28.8 05/24/2021    MCHC 33.5 05/24/2021    RDW 12.6 05/24/2021    LYMPHOPCT 14.2 05/24/2021    MONOPCT 6.4 05/24/2021    BASOPCT 0.2 05/24/2021    MONOSABS 0.26 05/24/2021    LYMPHSABS 0.58 05/24/2021    EOSABS 0.00 05/24/2021    BASOSABS 0.01 05/24/2021     CMP:    Lab Results   Component Value Date     05/24/2021    K 4.6 05/24/2021     05/24/2021    CO2 22 05/24/2021    BUN 17 05/24/2021    CREATININE 0.9 05/24/2021    GFRAA >60 05/24/2021    LABGLOM >60 05/24/2021    GLUCOSE 184 05/24/2021    PROT 6.9 05/24/2021    LABALBU 3.5 05/24/2021    CALCIUM 8.6 05/24/2021    BILITOT 0.5 05/24/2021    ALKPHOS 67 05/24/2021    AST 35 05/24/2021    ALT 31 05/24/2021     Magnesium:    Lab Results   Component Value Date    MG 2.7 05/24/2021     Phosphorus:    Lab Results   Component Value Date    PHOS 3.8 05/24/2021        Radiology:   CTA PULMONARY W CONTRAST   Final Result   No pulmonary embolism. Cardiomegaly. Bilateral parenchymal infiltrates which likely represent pneumonia, given the   patient's reported history of viral infection. Continued follow-up   recommended. XR CHEST PORTABLE   Final Result   Diminished lung volume. Enlarged cardiac silhouette. Bilateral infiltrates. Findings may reflect edema or multifocal pneumonia. Continued follow-up recommended. Assessment:    Active Problems:    Acute respiratory failure due to COVID-19 [facility])    Acute respiratory failure with hypoxia (HCC)    Pneumonia due to COVID-19 virus    Elevated lactic acid level    Class 3 severe obesity with body mass index (BMI) of 40.0 to 44.9 in adult [facility])  Resolved Problems:    * No resolved hospital problems. *      Plan:  1.  Acute respiratory failure with hypoxia(70'so2 sat)POA  Discussed with pulmonary  Adjust o2 as needed  2. Pneumonia due to covid 19 steroids and remdesivir. toci given   3. Dehydration iv fluids  4. Elevated lactic acid level monitor improving  5. Obesity (bmi40.11) rec wt loss  6. Hyperglycemia likely due to stress/steroids    Trial of one time dose of ativan to help relax pt. Pt monitored closely during this period. Did ok.  Will order benadryl subsequently for sleep    Electronically signed by Shadi Daniels DO on 5/24/2021 at 7:50 PM

## 2021-05-24 NOTE — CARE COORDINATION
Social Work discharge planning   Sw called pt's room to discuss ACP, as he is in isolation for Covid positive test 5/16/21. No answer. Sw spoke to Lo, who advised pt is sleeping presently. However, RN said pt will be able to talk over phone if awake. SW will try again later today. Electronically signed by Mariel Bermeo on 5/24/2021 at 10:38 AM     ADDENDUM-    Attempted to talk t pt over phone again, but no answer. Will continue to attempt contact to discuss ACP. Electronically signed by Mariel Bermeo on 5/24/2021 at 11:44 AM     Addendum-    Noted pt having anxiety and restlessness per RN's note earlier today. Sw does not want to call in room and wake him if he sleeping now. Enrico spoke to staff on 6S now, but they were uncertain of pt's status. SW will try again.   Electronically signed by Mariel Bermeo on 5/24/2021 at 3:27 PM

## 2021-05-25 NOTE — PROGRESS NOTES
Pulmonary Progress Note    Admit Date: 2021  Requesting Physician: Marlene Garland RN    SUBJECTIVE:  Having \"panic\" attacks. Feels the optiflow nasal pillow is suffocating him. He denies much dyspnea with limited activity in room. Denies any cough. Medications:     sodium chloride      sodium chloride 75 mL/hr at 21 0933      sodium chloride flush  5-40 mL Intravenous 2 times per day    enoxaparin  40 mg Subcutaneous Daily    dexamethasone  6 mg Intravenous Q12H    remdesivir IVPB  100 mg Intravenous Q24H    zinc sulfate  50 mg Oral Daily    ascorbic acid  500 mg Oral BID    vitamin D  2,000 Units Oral Daily         Vitals:    VITALS:  /62   Pulse 59   Temp 98 °F (36.7 °C) (Oral)   Resp 20   Ht 6' 1\" (1.854 m)   Wt (!) 304 lb (137.9 kg)   SpO2 (!) 89%   BMI 40.11 kg/m²   24HR INTAKE/OUTPUT:      Intake/Output Summary (Last 24 hours) at 2021 1017  Last data filed at 2021 2316  Gross per 24 hour   Intake 654.83 ml   Output 1650 ml   Net -995.17 ml     CURRENT PULSE OXIMETRY:  SpO2: (!) 89 %  24HR PULSE OXIMETRY RANGE:  SpO2  Av %  Min: 88 %  Max: 92 %      EXAM:  General: No distress. ENT: Pharynx clear. Neck: Trachea midline. Resp: No accessory muscle use. Diminished without rales. No wheezing. No rhonchi. CV: Regular rate. Regular rhythm. No mumur or rub. ABD: Non-tender. Non-distended. Normal bowel sounds. Skin: Warm and dry. No rash on exposed extremities. Ext: No joint deformity. No clubbing. No cyanosis. No edema  Neuro: Awake.  Follows commands      Lab Results:  CBC:   Recent Labs     21  0553 21   WBC 5.8 4.1*   HGB 15.5 13.7   HCT 47.6 40.9   MCV 86.9 86.1    218     BMP:   Recent Labs     21  0553 21    139   K 4.5 4.6    105   CO2 26 22   PHOS  --  3.8   BUN 20 17   CREATININE 1.1 0.9     LFT:   Recent Labs     21  0553 21  0312   ALKPHOS 79 67   ALT 35 31   AST 37 35 PROT 7.9 6.9   BILITOT 0.6 0.5   LABALBU 4.1 3.5     PT/INR:   Recent Labs     05/23/21  1010   PROTIME 11.8   INR 1.1       Cultures:  No results for input(s): CULTRESP in the last 72 hours. ABG:   Recent Labs     05/23/21  1039   PH 7.410   PO2 83.8   PCO2 35.8   HCO3 22.2   BE -1.8   O2SAT 96.6       Films:  XR CHEST PORTABLE 05/23/21:  Lung volumes are diminished. Cardiac silhouette is enlarged. No pneumothorax. Moderate diffuse bilateral infiltrates. No obvious effusion. Diminished lung volume. Enlarged cardiac silhouette. Bilateral infiltrates. Findings may reflect edema or multifocal pneumonia. Continued follow-up recommended. CTA PULMONARY W CONTRAST 5/23/2021: No pulmonary embolism or aortic dissection. Normal-size lymph nodes without adenopathy by size criteria. Mild cardiomegaly. No pleural or pericardial effusion. Diffuse hepatic fatty infiltration. Visualized portions of the upper abdomen otherwise demonstrate no acute abnormality. No pneumothorax. There are fairly extensive ground-glass and patchy airspace infiltrates involving the right greater than left lung diffusely. No pulmonary embolism. Cardiomegaly. Bilateral parenchymal infiltrates which likely represent pneumonia, given the patient's reported history of viral infection. Continued follow-up recommended. Assessment:  · COVID pneumonia  · Acute hypoxic respiratory failure secondary to COVID pneumonia      Plan:  · Optiflow at 55L, 65%, wean to keep pox >88%  · Not tolerating optiflow well with panic type symptoms at times, so will try Bipap, titrate to comfort using full face mask for breaks from optiflow and at HS. · Received 1 dose of Toci 5/23  · Continue Remdesivir, day #3  · Continue Dexamethasone IV Q6 for now with high O2 requirements  · Follow inflammatory markers: D Dimer 333, Ferritin 1677, fibrinogen, CRP 6.7-->12.2, .   · Procalcitonin 0.20  · Low dose Lovenox  · Will add, zinc, vit C, vit D  · IS  · Prone as able          Electronically signed by FAWAD Moore CNP on 5/25/2021 at 10:17 AM    I have personally participated in the history, exam, medical decision making with the NP on the date of service and I agree with all of the pertinent clinical information unless otherwise noted. I have also reviewed and agree with the past medical, family, and social history unless otherwise noted.   Asking for a smaller mask

## 2021-05-25 NOTE — PROGRESS NOTES
Patient reports that he feels like he is having a panic attack. He reports feeling very jittery/resless and would like to sit in the chair and is requesting something for his anxiety. Dr. Jimmy Pérez notified, orders received for ativan prn and benadryl prn. Patient assisted into chair and reports feeling a little better.

## 2021-05-25 NOTE — PLAN OF CARE
Problem: Airway Clearance - Ineffective  Goal: Achieve or maintain patent airway  5/25/2021 0956 by Edwin Jefferson RN  Outcome: Ongoing  5/25/2021 0419 by oJe Horn RN  Outcome: Met This Shift     Problem: Gas Exchange - Impaired  Goal: Absence of hypoxia  Outcome: Ongoing  Goal: Promote optimal lung function  Outcome: Ongoing     Problem: Breathing Pattern - Ineffective  Goal: Ability to achieve and maintain a regular respiratory rate  Outcome: Ongoing     Problem:  Body Temperature -  Risk of, Imbalanced  Goal: Ability to maintain a body temperature within defined limits  Outcome: Ongoing  Goal: Will regain or maintain usual level of consciousness  Outcome: Ongoing  Goal: Complications related to the disease process, condition or treatment will be avoided or minimized  Outcome: Ongoing     Problem: Isolation Precautions - Risk of Spread of Infection  Goal: Prevent transmission of infection  5/25/2021 0956 by Edwin Jefferson RN  Outcome: Ongoing  5/25/2021 0419 by Joe Horn RN  Outcome: Met This Shift     Problem: Nutrition Deficits  Goal: Optimize nutritional status  Outcome: Ongoing     Problem: Risk for Fluid Volume Deficit  Goal: Maintain normal heart rhythm  Outcome: Ongoing  Goal: Maintain absence of muscle cramping  Outcome: Ongoing  Goal: Maintain normal serum potassium, sodium, calcium, phosphorus, and pH  Outcome: Ongoing     Problem: Loneliness or Risk for Loneliness  Goal: Demonstrate positive use of time alone when socialization is not possible  Outcome: Ongoing     Problem: Fatigue  Goal: Verbalize increase energy and improved vitality  Outcome: Ongoing     Problem: Patient Education: Go to Patient Education Activity  Goal: Patient/Family Education  Outcome: Ongoing     Problem: Falls - Risk of:  Goal: Will remain free from falls  Description: Will remain free from falls  5/25/2021 0956 by Edwin Jefferson RN  Outcome: Ongoing  5/25/2021 0419 by Joe Horn RN  Outcome: Met This Shift  Goal: Absence of physical injury  Description: Absence of physical injury  5/25/2021 0956 by Sohan Muir RN  Outcome: Ongoing  5/25/2021 0419 by Urban Moctezuma RN  Outcome: Met This Shift

## 2021-05-25 NOTE — PLAN OF CARE
Problem: Airway Clearance - Ineffective  Goal: Achieve or maintain patent airway  Outcome: Met This Shift     Problem: Isolation Precautions - Risk of Spread of Infection  Goal: Prevent transmission of infection  Outcome: Met This Shift     Problem: Falls - Risk of:  Goal: Will remain free from falls  Description: Will remain free from falls  Outcome: Met This Shift  Goal: Absence of physical injury  Description: Absence of physical injury  Outcome: Met This Shift

## 2021-05-25 NOTE — CARE COORDINATION
Social Work discharge [facility] has not been able to talk to pt over phone today. He has been on bipap today as well. SW or CM will still need to complete ACP with pt when he is able. OLIVIA spoke to wife Yoanna Foley [phone], who advised she works as an OT herself at [employer]. Their home is 1 story with 5 steps to enter, and their 3 teen aged children live there as well. Wife confirmed pt's PCP is Dr Zeenat Hennessy. Wife advised that pt had home 02 through [facility] for [facility] when he was discharged from another hospital. She said the home 02 is still at their home. He also has a nebulizer. Wife stated understanding that once pt is more medically stable, he will likely need PT OT evals to assist with discharge planning. SW encouraged her to ask for SW or CM if discharge planning needs arise. Social Work to follow for support and discharge planning with CM.

## 2021-05-25 NOTE — PROGRESS NOTES
[facility]iselda [facility] Hospitalist   Progress Note    Admitting Date and Time: 5/23/2021  5:42 AM  Admit Dx: Acute respiratory failure due to COVID-19 ([facility] [U07.1, J96.00]    Subjective:    Patient was admitted with Acute respiratory failure due to COVID-19 [facility] [U07.1, J96.00]. Patient denies fever, chills, cp,  N/v.  Pt with some sob but overall feels a little better     sodium chloride flush  5-40 mL Intravenous 2 times per day    enoxaparin  40 mg Subcutaneous Daily    dexamethasone  6 mg Intravenous Q12H    remdesivir IVPB  100 mg Intravenous Q24H    zinc sulfate  50 mg Oral Daily    ascorbic acid  500 mg Oral BID    vitamin D  2,000 Units Oral Daily     LORazepam, 0.5 mg, Q6H PRN  diphenhydrAMINE, 25 mg, Q6H PRN  diphenhydrAMINE, 25 mg, Nightly PRN  calcium carbonate, 500 mg, TID PRN  sodium chloride flush, 5-40 mL, PRN  sodium chloride, 25 mL, PRN  promethazine, 12.5 mg, Q6H PRN   Or  ondansetron, 4 mg, Q6H PRN  polyethylene glycol, 17 g, Daily PRN  acetaminophen, 650 mg, Q6H PRN   Or  acetaminophen, 650 mg, Q6H PRN  sodium chloride, 30 mL, PRN  albuterol sulfate HFA, 2 puff, Q6H PRN  benzonatate, 100 mg, TID PRN         Objective:    BP (!) 163/84   Pulse 63   Temp 98.5 °F (36.9 °C) (Axillary)   Resp 27   Ht 6' 1\" (1.854 m)   Wt (!) 304 lb (137.9 kg)   SpO2 96%   BMI 40.11 kg/m²   Skin: warm and dry, no rash or erythema  Pulmonary/Chest: clear to auscultation bilaterally- no wheezes, rales or rhonchi, normal air movement, no respiratory distress  Cardiovascular: rhythm reg at rate of 64  Abdomen: soft, non-tender, non-distended, normal bowel sounds, no masses or organomegaly  Extremities: no cyanosis, no clubbing and no edema      Recent Labs     05/23/21  0553 05/24/21  0312 05/25/21  1020    139 136   K 4.5 4.6 4.8    105 105   CO2 26 22 20*   BUN 20 17 19   CREATININE 1.1 0.9 0.8   GLUCOSE 157* 184* 172*   CALCIUM 9.4 8.6 8.6       Recent Labs     05/23/21  0564 05/24/21  0312 05/25/21  1020   WBC 5.8 4.1* 5.3   RBC 5.48 4.75 4.84   HGB 15.5 13.7 13.7   HCT 47.6 40.9 42.5   MCV 86.9 86.1 87.8   MCH 28.3 28.8 28.3   MCHC 32.6 33.5 32.2   RDW 12.7 12.6 12.6    218 281   MPV 9.4 9.4 9.5       CBC with Differential:    Lab Results   Component Value Date    WBC 5.3 05/25/2021    RBC 4.84 05/25/2021    HGB 13.7 05/25/2021    HCT 42.5 05/25/2021     05/25/2021    MCV 87.8 05/25/2021    MCH 28.3 05/25/2021    MCHC 32.2 05/25/2021    RDW 12.6 05/25/2021    NRBC 0.0 05/25/2021    METASPCT 1.7 05/25/2021    LYMPHOPCT 8.7 05/25/2021    MONOPCT 7.8 05/25/2021    MYELOPCT 0.9 05/25/2021    BASOPCT 0.0 05/25/2021    MONOSABS 0.42 05/25/2021    LYMPHSABS 0.48 05/25/2021    EOSABS 0.00 05/25/2021    BASOSABS 0.00 05/25/2021     BMP:    Lab Results   Component Value Date     05/25/2021    K 4.8 05/25/2021    K 4.6 05/24/2021     05/25/2021    CO2 20 05/25/2021    BUN 19 05/25/2021    LABALBU 3.0 05/25/2021    CREATININE 0.8 05/25/2021    CALCIUM 8.6 05/25/2021    GFRAA >60 05/25/2021    LABGLOM >60 05/25/2021    GLUCOSE 172 05/25/2021     Hepatic Function Panel:    Lab Results   Component Value Date    ALKPHOS 76 05/25/2021    ALT 27 05/25/2021    AST 32 05/25/2021    PROT 6.6 05/25/2021    BILITOT 0.5 05/25/2021    BILIDIR <0.2 05/25/2021    IBILI see below 05/25/2021    LABALBU 3.0 05/25/2021        Radiology:   CTA PULMONARY W CONTRAST   Final Result   No pulmonary embolism. Cardiomegaly. Bilateral parenchymal infiltrates which likely represent pneumonia, given the   patient's reported history of viral infection. Continued follow-up   recommended. XR CHEST PORTABLE   Final Result   Diminished lung volume. Enlarged cardiac silhouette. Bilateral infiltrates. Findings may reflect edema or multifocal pneumonia. Continued follow-up recommended.              Assessment:    Active Problems:    Acute respiratory failure due to COVID-19 ([facility]    Acute respiratory failure with hypoxia ([facility]    Pneumonia due to COVID-19 virus    Elevated lactic acid level    Class 3 severe obesity with body mass index (BMI) of 40.0 to 44.9 in adult [facility])    Dehydration  Resolved Problems:    * No resolved hospital problems. *      Plan:  1. Acute respiratory failure with hypoxia(70'so2 sat)POA  Discussed with pulmonary  Adjust o2 as needed  2. Pneumonia due to covid 19 steroids and remdesivir. s/p toci   3. Dehydration iv fluids  4. Elevated lactic acid level monitor improving  5. Obesity (bmi40.11) rec wt loss  6.  Hyperglycemia likely due to stress/steroids monitor        Electronically signed by Jacques Andersen DO on 5/25/2021 at 6:58 PM

## 2021-05-25 NOTE — PROGRESS NOTES
Pt on call light bipap mask off pt states it felt funny and he pulled it off. Pt educated on o2 requirements and need for bipap. Mask placed back on will monitor.

## 2021-05-26 NOTE — PROGRESS NOTES
5/26/2021 pt placed on high flow nc to try and eat dinner, pt very anxious at this time,states that it make him want to vomit. Py dry heaving and not able to tolerate being on high flow at this time.  Pt place back on bipap

## 2021-05-26 NOTE — PROGRESS NOTES
HCT 40.9 42.5 44.8   MCV 86.1 87.8 86.2   MCH 28.8 28.3 27.7   MCHC 33.5 32.2 32.1   RDW 12.6 12.6 12.4    281 280   MPV 9.4 9.5 9.4       CBC with Differential:    Lab Results   Component Value Date    WBC 9.0 05/26/2021    RBC 5.20 05/26/2021    HGB 14.4 05/26/2021    HCT 44.8 05/26/2021     05/26/2021    MCV 86.2 05/26/2021    MCH 27.7 05/26/2021    MCHC 32.1 05/26/2021    RDW 12.4 05/26/2021    NRBC 1.0 05/26/2021    METASPCT 2.0 05/26/2021    LYMPHOPCT 7.0 05/26/2021    MONOPCT 2.0 05/26/2021    MYELOPCT 0.9 05/25/2021    BASOPCT 0.0 05/26/2021    MONOSABS 0.18 05/26/2021    LYMPHSABS 0.63 05/26/2021    EOSABS 0.00 05/26/2021    BASOSABS 0.00 05/26/2021        Radiology:   CTA PULMONARY W CONTRAST   Final Result   No pulmonary embolism. Cardiomegaly. Bilateral parenchymal infiltrates which likely represent pneumonia, given the   patient's reported history of viral infection. Continued follow-up   recommended. XR CHEST PORTABLE   Final Result   Diminished lung volume. Enlarged cardiac silhouette. Bilateral infiltrates. Findings may reflect edema or multifocal pneumonia. Continued follow-up recommended. Assessment:    Active Problems:    Acute respiratory failure due to COVID-19 [facility])    Acute respiratory failure with hypoxia (HCC)    Pneumonia due to COVID-19 virus    Elevated lactic acid level    Class 3 severe obesity with body mass index (BMI) of 40.0 to 44.9 in adult [facility])    Dehydration  Resolved Problems:    * No resolved hospital problems. *      Plan:  1. Acute respiratory failure with hypoxia(70'so2 sat)POA  Discussed with pulmonary  Adjust o2 as needed  2. Pneumonia due to covid 19 steroids and remdesivir. s/p toci   3. Dehydration iv fluids  4. Elevated lactic acid level monitor improving  5. Obesity (bmi40.11) rec wt loss  6. Hyperglycemia likely due to stress/steroids monitor  7.  Elevated bp without dx of htn  Monitor and if continue to trend upward, may need to consider placing him on po med    Pt wanting to go home today. Discussed with pt that I am very concerned about his breathing. Did inform him that if he further declines that he would need to be intubated. Time given for questions and all questions answered.      Electronically signed by Pillo Singh DO on 5/26/2021 at 7:51 PM

## 2021-05-26 NOTE — PROGRESS NOTES
Pulmonary Progress Note    Admit Date: 2021  Requesting Physician: Angelica Pino RN    SUBJECTIVE:  Feels as though the Bipap is better than the Optiflow but still feels suffocated  Very depressed today and crying  Worried he will not go home  Denies dyspnea, dry cough, very tired      Medications:     sodium chloride      sodium chloride 50 mL/hr at 21 2322      enoxaparin  30 mg Subcutaneous BID    sodium chloride flush  5-40 mL Intravenous 2 times per day    dexamethasone  6 mg Intravenous Q12H    remdesivir IVPB  100 mg Intravenous Q24H    zinc sulfate  50 mg Oral Daily    ascorbic acid  500 mg Oral BID    vitamin D  2,000 Units Oral Daily         Vitals:    VITALS:  BP (!) 143/77   Pulse 67   Temp 97.2 °F (36.2 °C) (Axillary)   Resp (!) 32   Ht 6' 1\" (1.854 m)   Wt (!) 304 lb (137.9 kg)   SpO2 96%   BMI 40.11 kg/m²   24HR INTAKE/OUTPUT:      Intake/Output Summary (Last 24 hours) at 2021 1108  Last data filed at 2021 0232  Gross per 24 hour   Intake --   Output 1250 ml   Net -1250 ml     CURRENT PULSE OXIMETRY:  SpO2: 96 %  24HR PULSE OXIMETRY RANGE:  SpO2  Av.5 %  Min: 91 %  Max: 96 %      EXAM:  General: No distress. ENT: Pharynx clear. Neck: Trachea midline. Resp: No accessory muscle use. Diminished with faint rales bibasilar. No wheezing. No rhonchi. CV: Regular rate. Regular rhythm. No mumur or rub. ABD: Non-tender. Non-distended. Normal bowel sounds. Skin: Warm and dry. No rash on exposed extremities. Ext: No joint deformity. No clubbing. No cyanosis. No edema  Neuro: Awake.  Follows commands      Lab Results:  CBC:   Recent Labs     21  0312 21  1020 21  0752   WBC 4.1* 5.3 9.0   HGB 13.7 13.7 14.4   HCT 40.9 42.5 44.8   MCV 86.1 87.8 86.2    281 280     BMP:   Recent Labs     21  0312 21  1020    136   K 4.6 4.8    105   CO2 22 20*   PHOS 3.8  --    BUN 17 19   CREATININE 0.9 0.8     LFT:   Recent procalcitonin 05/25 0.11  · COVID pneumonia on remdesivir started 5/23. · Acute hypoxic respiratory failure secondary to COVID pneumonia on Decadron  · Increase inflammatory response due to coronavirus  s/p Toci 5/23 D Dimer 333-->1231, Ferritin 1677, fibrinogen, CRP 6.7-->12.2-->5.8, . · Lactic acidosis  · Hyperglycemia due to steroids  · Anxiety  · Hypertension      Plan:  · Currently on bipap, wean to keep pox >88% - did not tolerate Optiflow with panic attacks. Ok for HFNC if able to tolerated for meals. · Low dose Lovenox  · Continue zinc, vit C, vit D  · IS  · Prone as able  · Emotional support given      WILLIS Castillo    Electronically signed by FAWAD Castillo CNP on 5/26/2021 at 11:08 AM    I have personally participated in the history, exam, medical decision making with the NP on the date of service and I agree with all of the pertinent clinical information unless otherwise noted. I have also reviewed and agree with the past medical, family, and social history unless otherwise noted.     High flow O2 for eating  May benefit from home medications for hypertension  Procalcitonin low  Okay to have saturations in the high 80s as long as he is mentating okay

## 2021-05-26 NOTE — PLAN OF CARE
Problem: Airway Clearance - Ineffective  Goal: Achieve or maintain patent airway  5/25/2021 2330 by William Talamantes RN  Outcome: Met This Shift  5/25/2021 0956 by Allan Ferris RN  Outcome: Ongoing     Problem: Body Temperature -  Risk of, Imbalanced  Goal: Ability to maintain a body temperature within defined limits  5/25/2021 2330 by William Talamantes RN  Outcome: Met This Shift  5/25/2021 0956 by Allan Ferris RN  Outcome: Ongoing  Goal: Will regain or maintain usual level of consciousness  5/25/2021 2330 by William Talamantes RN  Outcome: Met This Shift  5/25/2021 0956 by Allan Ferris RN  Outcome: Ongoing     Problem: Isolation Precautions - Risk of Spread of Infection  Goal: Prevent transmission of infection  5/25/2021 2330 by William Talamantes RN  Outcome: Met This Shift  5/25/2021 0956 by Allan Ferris RN  Outcome: Ongoing     Problem: Falls - Risk of:  Goal: Will remain free from falls  Description: Will remain free from falls  5/25/2021 2330 by William Talamantes RN  Outcome: Met This Shift  5/25/2021 0956 by Allan Ferris RN  Outcome: Ongoing  Goal: Absence of physical injury  Description: Absence of physical injury  5/25/2021 2330 by William Talamantes RN  Outcome: Met This Shift  5/25/2021 0956 by Allan Ferris RN  Outcome: Ongoing     Problem: Gas Exchange - Impaired  Goal: Absence of hypoxia  5/25/2021 2330 by William Talamantes RN  Outcome: Ongoing  5/25/2021 0956 by Allan Ferris RN  Outcome: Ongoing  Goal: Promote optimal lung function  5/25/2021 2330 by William Talamantes RN  Outcome: Ongoing  5/25/2021 0956 by Allan Ferris RN  Outcome: Ongoing     Problem: Breathing Pattern - Ineffective  Goal: Ability to achieve and maintain a regular respiratory rate  5/25/2021 2330 by William Talamantes RN  Outcome: Ongoing  5/25/2021 0956 by Allan Ferris RN  Outcome: Ongoing     Problem:  Body Temperature -  Risk of, Imbalanced  Goal: Complications related to the disease process, condition or treatment will be avoided or minimized  5/25/2021 0956 by Everton Melchor RN  Outcome: Ongoing     Problem: Nutrition Deficits  Goal: Optimize nutritional status  5/25/2021 0956 by Everton Melchor RN  Outcome: Ongoing     Problem: Risk for Fluid Volume Deficit  Goal: Maintain normal heart rhythm  5/25/2021 0956 by Everton Melchor RN  Outcome: Ongoing  Goal: Maintain absence of muscle cramping  5/25/2021 0956 by Everton Melchor RN  Outcome: Ongoing  Goal: Maintain normal serum potassium, sodium, calcium, phosphorus, and pH  5/25/2021 0956 by Everton Melchor RN  Outcome: Ongoing     Problem: Loneliness or Risk for Loneliness  Goal: Demonstrate positive use of time alone when socialization is not possible  5/25/2021 0956 by Everton Melchor RN  Outcome: Ongoing     Problem: Fatigue  Goal: Verbalize increase energy and improved vitality  5/25/2021 0956 by Everton Melchor RN  Outcome: Ongoing     Problem: Patient Education: Go to Patient Education Activity  Goal: Patient/Family Education  5/25/2021 0956 by Everton Melchor RN  Outcome: Ongoing

## 2021-05-26 NOTE — PLAN OF CARE
Problem: Airway Clearance - Ineffective  Goal: Achieve or maintain patent airway  5/26/2021 1030 by Spring Moreland RN  Outcome: Ongoing  5/25/2021 2330 by Yuri Healy RN  Outcome: Met This Shift     Problem: Gas Exchange - Impaired  Goal: Absence of hypoxia  5/26/2021 1030 by Spring Moreland RN  Outcome: Ongoing  5/25/2021 2330 by Yuri Healy RN  Outcome: Ongoing  Goal: Promote optimal lung function  5/26/2021 1030 by Spring Moreland RN  Outcome: Ongoing  5/25/2021 2330 by Yuri Healy RN  Outcome: Ongoing     Problem: Breathing Pattern - Ineffective  Goal: Ability to achieve and maintain a regular respiratory rate  5/26/2021 1030 by Spring Moreland RN  Outcome: Ongoing  5/25/2021 2330 by Yuri Healy RN  Outcome: Ongoing     Problem:  Body Temperature -  Risk of, Imbalanced  Goal: Ability to maintain a body temperature within defined limits  5/26/2021 1030 by Spring Moreland RN  Outcome: Ongoing  5/25/2021 2330 by Yuri Healy RN  Outcome: Met This Shift  Goal: Will regain or maintain usual level of consciousness  5/26/2021 1030 by Spring Moreland RN  Outcome: Ongoing  5/25/2021 2330 by Yuri Healy RN  Outcome: Met This Shift  Goal: Complications related to the disease process, condition or treatment will be avoided or minimized  Outcome: Ongoing     Problem: Isolation Precautions - Risk of Spread of Infection  Goal: Prevent transmission of infection  5/26/2021 1030 by Spring Moreland RN  Outcome: Ongoing  5/25/2021 2330 by Yuri Healy RN  Outcome: Met This Shift     Problem: Nutrition Deficits  Goal: Optimize nutritional status  Outcome: Ongoing     Problem: Risk for Fluid Volume Deficit  Goal: Maintain normal heart rhythm  Outcome: Ongoing  Goal: Maintain absence of muscle cramping  Outcome: Ongoing  Goal: Maintain normal serum potassium, sodium, calcium, phosphorus, and pH  Outcome: Ongoing     Problem: Loneliness or Risk for Loneliness  Goal: Demonstrate positive use of time alone when socialization is not possible  Outcome: Ongoing     Problem: Fatigue  Goal: Verbalize increase energy and improved vitality  Outcome: Ongoing     Problem: Patient Education: Go to Patient Education Activity  Goal: Patient/Family Education  Outcome: Ongoing     Problem: Falls - Risk of:  Goal: Will remain free from falls  Description: Will remain free from falls  5/26/2021 1030 by Julius Vo RN  Outcome: Ongoing  5/25/2021 2330 by Maru Armenta RN  Outcome: Met This Shift  Goal: Absence of physical injury  Description: Absence of physical injury  5/26/2021 1030 by Julius Vo RN  Outcome: Ongoing  5/25/2021 2330 by Maru Armenta RN  Outcome: Met This Shift

## 2021-05-26 NOTE — PROGRESS NOTES
[facility] Quality Flow/Interdisciplinary Rounds Progress Note        Quality Flow Rounds held on May 26, 2021    Disciplines Attending:  Bedside Nurse, ,  and Nursing Unit Leadership    Anil Liu was admitted on 5/23/2021  5:42 AM    Anticipated Discharge Date:  Expected Discharge Date: 05/28/21    Disposition:    Wisam Score:  Wisam Scale Score: 20    Readmission Risk              Risk of Unplanned Readmission:  12           Discussed patient goal for the day, patient clinical progression, and barriers to discharge.   The following Goal(s) of the Day/Commitment(s) have been identified:  wean oxygen as tolerated       Matteo Mclaughlin RN  May 26, 2021

## 2021-05-26 NOTE — PROGRESS NOTES
5/26/2021 pt offer to be placed on high flow nasal cannula so he could eat or get cleaned up. Pt refused at this time.

## 2021-05-27 PROBLEM — U07.1 COVID-19: Status: ACTIVE | Noted: 2021-01-01

## 2021-05-27 PROBLEM — U07.1 ACUTE RESPIRATORY FAILURE DUE TO COVID-19 (HCC): Status: RESOLVED | Noted: 2021-01-01 | Resolved: 2021-01-01

## 2021-05-27 PROBLEM — J96.00 ACUTE RESPIRATORY FAILURE DUE TO COVID-19 (HCC): Status: RESOLVED | Noted: 2021-01-01 | Resolved: 2021-01-01

## 2021-05-27 NOTE — PROGRESS NOTES
Pt's wife called desk, stating we are to switch the pt from Dr. Nagi Poe to Dr. Alannah Araiza. Notified Dr. Alannah Araiza.

## 2021-05-27 NOTE — CARE COORDINATION
+ Covid. Day 5 of IV Remdesivir. On bipap- 75% FiO2. Pulm following, new ID consult- await eval. Unable to speak to pt over phone due to pt's increase respiratory demands. Per chart review pt resides at home with his wife and children. Pt has nebulizer and home O2 through [facility]. Await clinical progress for further discharge planning/needs.

## 2021-05-27 NOTE — PROGRESS NOTES
Called by nursing that patient's family requested to transfer service to Dr Alannah Araiza. I discussed with Dr Alannah Araiza who agrees to accept care. Will sign off [facility].     Grace Torres MD

## 2021-05-27 NOTE — PROCEDURES
Bilateral Chest Tube Placement Procedure Note    Indication: pneumothorax    Consent: The patient provided verbal consent for this procedure. Pre-Medication: midazolam (Versed) intravenously    Procedure: The patient was placed in a semirecumbent position with the head of the bed at 30 degrees. Both sides were prepped with betadine and draped in a sterile fashion. Local anesthesia over the insertion site was obtained by infiltration using 1% Lidocaine with epinephrine. An incision was made laterally in the midaxillary line. Blunt dissection up and over the rib was performed until access was obtained into the pleural cavity. A 28 Guyanese chest tube was placed and connected to a pleurivac at 20 cm H2O this was repeated on the left and right side. Initial output from the tube was air. The tube was sutured in place and the site was covered with an occlusive dressing. All connections were banded. Breath sounds after the procedure were improved. A chest x-ray was obtained to evaluate placement which showed good tube position, and showed complete expansion of the lung. The patient tolerated the procedure well. Complications: none      Justyn Gottlieb DO PGY1    Estimated blood loss 10 cc from each side. I was present and performed integral parts of each procedure. Dominique Pride M.D.    Pulmonary/Critical Care Medicine

## 2021-05-27 NOTE — PROGRESS NOTES
[facility] Quality Flow/Interdisciplinary Rounds Progress Note        Quality Flow Rounds held on May 27, 2021    Disciplines Attending:  Bedside Nurse, ,  and Nursing Unit Leadership    Shawn Elliott was admitted on 5/23/2021  5:42 AM    Anticipated Discharge Date:  Expected Discharge Date: 05/28/21    Disposition:    Wisam Score:  Wisam Scale Score: 20    Readmission Risk              Risk of Unplanned Readmission:  14           Discussed patient goal for the day, patient clinical progression, and barriers to discharge. The following Goal(s) of the Day/Commitment(s) have been identified:  Remdesivir Day 5, IV decadron, new ID consult.        Arron Castellon RN  May 27, 2021

## 2021-05-27 NOTE — PROGRESS NOTES
Secure message sent to Dr. Yandy Handy and Dr. Teodora Patel re: respiratory status and Ddimer. Per Dr. Yandy Handy pt to be transported to ICU.

## 2021-05-27 NOTE — PROGRESS NOTES
Internal Medicine Progress Note    Patient's name: Nasra Shay  : 1985  Chief complaints (on day of admission): Shortness of Breath (x 2 days, + COVID, admitted at [facility] discharged on O2, O2 in mid 80s on 3L @ home)  Admission date: 2021  Date of service: 2021   Room: [address] INTERMEDIATE  Primary care physician: Moe Carr RN  Reason for visit: Follow-up for COVID-19 pneumonia    Subjective  Krystyna Flower was seen and examined. He was resting in his bed. He was very anxious. He remained short of breath. He tells me that he is not able to tolerate the OptiFlow at all. He states that it makes him gag. He definitely cannot tolerate the OptiFlow and eating. He prefers the BiPAP. I discussed the case at length with the patient's wife on the phone on 2 separate occasions. I answered all of her questions. I was asked to assume care of the patient today. One of the patient's family members called me and asked me to see the patient. I told them to have someone notified nursing that this was their preference and for me to be put on the treatment team.  I discussed the case with the primary service who were previously following and received a signout. Review of Systems  There are no new complaints of chest pain, abdominal pain, nausea, vomiting, diarrhea, constipation.     Hospital Medications  Current Facility-Administered Medications   Medication Dose Route Frequency Provider Last Rate Last Admin    sodium chloride (OCEAN, BABY AYR) 0.65 % nasal spray 2 spray  2 spray Each Nostril 4x Daily FAWAD Connell - CNP   2 spray at 21 1250    thiamine tablet 100 mg  100 mg Oral Daily Edward Guerrero DO        dronabinol (MARINOL) capsule 5 mg  5 mg Oral BID AC Edward Guerrero DO        LORazepam (ATIVAN) injection 1 mg  1 mg Intravenous Q6H PRN Edward Guerrero DO        diphenhydrAMINE (BENADRYL) injection 25 mg  25 mg Intravenous Q6H PRN Lakshmi Castro MD 25 mg at 05/27/21 1052    enoxaparin (LOVENOX) injection 30 mg  30 mg Subcutaneous BID Ko Hric, DO   30 mg at 05/27/21 1055    diphenhydrAMINE (BENADRYL) tablet 25 mg  25 mg Oral Nightly PRN Ko Hric, DO   25 mg at 05/24/21 2044    calcium carbonate (TUMS) chewable tablet 500 mg  500 mg Oral TID PRN Steffi Melgar MD   500 mg at 05/24/21 2314    sodium chloride flush 0.9 % injection 5-40 mL  5-40 mL Intravenous 2 times per day Ko Hric, DO   10 mL at 05/27/21 1056    sodium chloride flush 0.9 % injection 5-40 mL  5-40 mL Intravenous PRN Ko Hric, DO   10 mL at 05/24/21 1352    0.9 % sodium chloride infusion  25 mL Intravenous PRN Ko Hric, DO        promethazine (PHENERGAN) tablet 12.5 mg  12.5 mg Oral Q6H PRN Ko Hric, DO        Or    ondansetron (ZOFRAN) injection 4 mg  4 mg Intravenous Q6H PRN Ko Hric, DO   4 mg at 05/23/21 1950    polyethylene glycol (GLYCOLAX) packet 17 g  17 g Oral Daily PRN Ko Hric, DO        acetaminophen (TYLENOL) tablet 650 mg  650 mg Oral Q6H PRN Ko Hric, DO        Or    acetaminophen (TYLENOL) suppository 650 mg  650 mg Rectal Q6H PRN Ko Hric, DO        0.9 % sodium chloride infusion   Intravenous Continuous Ko Hric, DO 50 mL/hr at 05/26/21 2030 New Bag at 05/26/21 2030    dexamethasone (DECADRON) injection 6 mg  6 mg Intravenous Q12H Ko Hric, DO   6 mg at 05/27/21 1053    0.9 % sodium chloride bolus  30 mL Intravenous PRN Ko Hric, DO        zinc sulfate (ZINCATE) capsule 50 mg  50 mg Oral Daily Cristina Emma, APRN - CNP   50 mg at 05/27/21 1052    ascorbic acid (VITAMIN C) tablet 500 mg  500 mg Oral BID Cristina Carter, APRN - CNP   500 mg at 05/27/21 1052    vitamin D (CHOLECALCIFEROL) tablet 2,000 Units  2,000 Units Oral Daily FAWAD Mayer - CNP   2,000 Units at 05/27/21 1052    albuterol sulfate  (90 Base) MCG/ACT inhaler 2 puff  2 puff Inhalation Q6H PRN Natalia Murillo MD   2 puff at 05/24/21 2047    benzonatate (TESSALON) capsule 100 mg  100 mg Oral TID PRN Kali Frances MD   100 mg at 05/25/21 2045       PRN Medications  LORazepam, diphenhydrAMINE, diphenhydrAMINE, calcium carbonate, sodium chloride flush, sodium chloride, promethazine **OR** ondansetron, polyethylene glycol, acetaminophen **OR** acetaminophen, sodium chloride, albuterol sulfate HFA, benzonatate    Objective  Most Recent Recorded Vitals  BP (!) 181/86   Pulse 71   Temp 97.5 °F (36.4 °C) (Axillary)   Resp (!) 36   Ht 6' 1\" (1.854 m)   Wt 293 lb 4 oz (133 kg)   SpO2 90%   BMI 38.69 kg/m²   I/O last 3 completed shifts: In: 8719 [I.V.:3727]  Out: 1200 [Urine:1200]  No intake/output data recorded. Physical Exam:  General: AAO to person/place/time/purpose, distressed and anxious, positive for labored breathing, BiPAP in place  Eyes: conjunctivae/corneas clear, sclera non icteric  Skin: color/texture/turgor normal, no rashes or lesions  Lungs: Diminished but fairly CTAB, no retractions/use of accessory muscles, no vocal fremitus, no rhonchi, no crackle, no rales  Heart: Abdomen obese, regular rate, regular rhythm, no murmur  Abdomen: soft, NT, bowel sounds normal  Extremities: atraumatic, no edema  Neurologic: cranial nerves 2-12 grossly intact, no slurred speech    Most Recent Labs  Lab Results   Component Value Date    WBC 11.3 05/27/2021    HGB 15.5 05/27/2021    HCT 47.5 05/27/2021     05/27/2021     05/27/2021    K 4.8 05/27/2021     05/27/2021    CREATININE 1.0 05/27/2021    BUN 24 (H) 05/27/2021    CO2 24 05/27/2021    GLUCOSE 195 (H) 05/27/2021    ALT 26 05/27/2021    AST 25 05/27/2021    INR 1.1 05/23/2021       CTA PULMONARY W CONTRAST   Final Result   No pulmonary embolism. Cardiomegaly. Bilateral parenchymal infiltrates which likely represent pneumonia, given the   patient's reported history of viral infection. Continued follow-up   recommended.          XR CHEST PORTABLE   Final Result   Diminished lung volume. Enlarged cardiac silhouette. Bilateral infiltrates. Findings may reflect edema or multifocal pneumonia. Continued follow-up recommended. Assessment   Active Hospital Problems    Diagnosis     Pneumonia due to COVID-19 virus [U07.1, J12.82]      Priority: High    Acute respiratory failure with hypoxia (HCC) [J96.01]      Priority: Medium    Dehydration [E86.0]     Elevated lactic acid level [R79.89]     Class 3 severe obesity with body mass index (BMI) of 40.0 to 44.9 in adult (HCC) [E66.01, Z68.41]          Plan  · COVID-19 viral pneumonia w/ associated acute hypoxic respiratory failure: Contact/droplet isolation. Remdesivir completed on 5/27. Decadron. Sp Toci on 5/23. Follow inflammatory markers. Charmaine Cull as able. Tylenol prn fevers or myalgias. Self proning as able. BID Lovenox. Vitamin protocol. Pulmonology following-case discussed on 5/27. CTA chest negative for PE. ID consultation for opinion. · Severe anxiety: Increase IV Ativan to 1 mg every 8 hours as needed. · Severe protein calorie malnutrition/dehydration, dysphagia: Trial Marinol. Consider EGD and/or NGT/TF's. IV fluid hydration. · Hyperglycemia: Likely due to steroids. Continue to monitor. Check hemoglobin A1c. · Hypertension: Likely multifactorial.  Continue to monitor. · Follow labs   · DVT prophylaxis  · Please see orders for further management and care. · Discharge plan: TBD pending clinical improvement    I discussed the case with pulmonology, they told me that they would be in the room when the patient takes the BiPAP off and tries the OptiFlow and tries to eat. I will defer this to them. Electronically signed by Kristal Wren DO on 5/27/2021 at 12:55 PM    I can be reached through [address] [place] [address].

## 2021-05-27 NOTE — PLAN OF CARE
Problem: Airway Clearance - Ineffective  Goal: Achieve or maintain patent airway  Outcome: Met This Shift     Problem: Gas Exchange - Impaired  Goal: Absence of hypoxia  Outcome: Met This Shift     Problem: Breathing Pattern - Ineffective  Goal: Ability to achieve and maintain a regular respiratory rate  Outcome: Ongoing     Problem:  Body Temperature -  Risk of, Imbalanced  Goal: Ability to maintain a body temperature within defined limits  Outcome: Met This Shift     Problem: Isolation Precautions - Risk of Spread of Infection  Goal: Prevent transmission of infection  Outcome: Met This Shift

## 2021-05-27 NOTE — CONSULTS
[address] [place] [address] [facility]  Consultation Note     Admit Date: 5/23/2021  5:42 AM    Reason for Consult:   COVID-19. Attending Physician:  Juaquin Becerra DO    HISTORY OF PRESENT ILLNESS:             The history is obtained from extensive review of available past medical records. The patient is a 43 y.o. male who presents to the ED on 5/23/2021 complaining of a 1 week history of shortness of breath. He had been diagnosed with SARS-CoV-2 1 week prior to the admission. This was characterized by body aches, headache, dry cough, fever, nausea and vomiting. He had been seen at [facility] 3 days before and admitted overnight and discharged on Dexamethasone and oxygen. In spite of this he was desaturating saturating in the mid 90s with 6 L nasal cannula. He was admitted and treated with Remdesivir. He also received 1 dose of Tocilizumab and was followed by pulmonary. In spite of treatment, the patient has continued to deteriorate and required increased oxygen demands. He has dismissed his first hospitalist.  Dr. China Reddy asked us to see him in consultation. Past Medical History:    History reviewed. No pertinent past medical history. Past Surgical History:    History reviewed. No pertinent surgical history.   Current Medications:   Scheduled Meds:   sodium chloride  2 spray Each Nostril 4x Daily    thiamine  100 mg Oral Daily    dronabinol  5 mg Oral BID AC    furosemide  20 mg Intravenous Once    enoxaparin  0.5 mg/kg Subcutaneous BID    sodium chloride flush  5-40 mL Intravenous 2 times per day    dexamethasone  6 mg Intravenous Q12H    zinc sulfate  50 mg Oral Daily    ascorbic acid  500 mg Oral BID    vitamin D  2,000 Units Oral Daily     Continuous Infusions:   sodium chloride      sodium chloride 50 mL/hr at 05/26/21 2030     PRN Meds:LORazepam, magic (miracle) mouthwash, diphenhydrAMINE, diphenhydrAMINE, calcium carbonate, sodium chloride flush, sodium chloride, promethazine **OR** ondansetron, polyethylene glycol, acetaminophen **OR** acetaminophen, sodium chloride, albuterol sulfate HFA, benzonatate    Allergies:  Sulfa antibiotics    Social History:   Social History     Socioeconomic History    Marital status:      Spouse name: None    Number of children: None    Years of education: None    Highest education level: None   Occupational History    None   Tobacco Use    Smoking status: Never Smoker    Smokeless tobacco: Never Used   Substance and Sexual Activity    Alcohol use: Not Currently    Drug use: Never    Sexual activity: None   Other Topics Concern    None   Social History Narrative    None     Social Determinants of Health     Financial Resource Strain:     Difficulty of Paying Living Expenses:    Food Insecurity:     Worried About Running Out of Food in the Last Year:     Ran Out of Food in the Last Year:    Transportation Needs:     Lack of Transportation (Medical):  Lack of Transportation (Non-Medical):    Physical Activity:     Days of Exercise per Week:     Minutes of Exercise per Session:    Stress:     Feeling of Stress :    Social Connections:     Frequency of Communication with Friends and Family:     Frequency of Social Gatherings with Friends and Family:     Attends Judaism Services:     Active Member of Clubs or Organizations:     Attends Club or Organization Meetings:     Marital Status:    Intimate Partner Violence:     Fear of Current or Ex-Partner:     Emotionally Abused:     Physically Abused:     Sexually Abused:       Pets: Dog and 2 rabbits   Patient lives at home with his wife and daughter. He works as a technician  Travel: No    Family History:   History reviewed. No pertinent family history. . Otherwise non-pertinent to the chief complaint.     REVIEW OF SYSTEMS:    Constitutional: Negative positive for fevers, chills, diaphoresis  Neurologic: Denies headache or loss of sense of taste and CO2 24 05/27/2021    BUN 24 05/27/2021    CREATININE 1.0 05/27/2021    GFRAA >60 05/27/2021    LABGLOM >60 05/27/2021    GLUCOSE 195 05/27/2021    PROT 7.0 05/27/2021    LABALBU 3.9 05/27/2021    CALCIUM 8.9 05/27/2021    BILITOT 0.7 05/27/2021    ALKPHOS 108 05/27/2021    AST 25 05/27/2021    ALT 26 05/27/2021       Lab Results   Component Value Date    PROTIME 11.8 05/23/2021    INR 1.1 05/23/2021       No results found for: TSH    No results found for: NITRITE, COLORU, PHUR, LABCAST, WBCUA, RBCUA, MUCUS, TRICHOMONAS, YEAST, BACTERIA, CLARITYU, SPECGRAV, LEUKOCYTESUR, UROBILINOGEN, BILIRUBINUR, BLOODU, GLUCOSEU, AMORPHOUS    Lab Results   Component Value Date    HCO3 17.7 05/27/2021    BE -6.5 05/27/2021    O2SAT 90.2 05/27/2021    PH 7.355 05/27/2021    PCO2 32.4 05/27/2021    PO2 60.8 05/27/2021     Radiology:      Chest x-ray showed bilateral pneumothoraces    Microbiology:  Pending  No results for input(s): BC in the last 72 hours. No results for input(s): ORG in the last 72 hours. No results for input(s): Muller Ohms in the last 72 hours. No results for input(s): STREPNEUMAGU in the last 72 hours. No results for input(s): LP1UAG in the last 72 hours. No results for input(s): ASO in the last 72 hours. No results for input(s): CULTRESP in the last 72 hours. Recent Labs     05/25/21  1020 05/27/21  0658   PROCAL 0.11* 0.10*       Assessment:  · SARS-CoV-2 infection with moderate to severe pneumonia. Treated with Tocilizumab and completed course of Remdesivir. CRP is down to 1.6 from a high of 12.2  · Acute respiratory failure  · Bilateral pneumothoraces  · Lymphopenia  · Although the patient has rabbits at home, tularemia seems to be less likely because this causes a picture of acute pulmonary failure with sepsis and septic shock.   This does not seem to be the case    Plan:    · Unfortunately, at this point I do not have any further recommendations  · The patient should be transferred down to the ICU and will most likely be intubated. He has expressed concerns about this being a death sentence. I told him that we would do our best to get him through this  · Will follow with you    Thank you for having us see this patient in consultation. Discussed with ICU team.  Spoke with nursing.     Shane Lopez MD  3:38 PM  5/27/2021

## 2021-05-27 NOTE — PROGRESS NOTES
Pulmonary Progress Note    Admit Date: 2021  Requesting Physician: Manny Garcia RN    SUBJECTIVE:  Seen on Bipap, tolerating well, however, now with FiO2 up to 75%. He denies any cough and reports no dyspnea despite hypoxemia. Denies leg pain. Afebrile. C/o dry, sore throat. Medications:     sodium chloride      sodium chloride 50 mL/hr at 21      enoxaparin  30 mg Subcutaneous BID    sodium chloride flush  5-40 mL Intravenous 2 times per day    dexamethasone  6 mg Intravenous Q12H    remdesivir IVPB  100 mg Intravenous Q24H    zinc sulfate  50 mg Oral Daily    ascorbic acid  500 mg Oral BID    vitamin D  2,000 Units Oral Daily         Vitals:    VITALS:  BP (!) 147/89   Pulse 60   Temp 97.1 °F (36.2 °C) (Axillary)   Resp 22 Comment: BiPAP  Ht 6' 1\" (1.854 m)   Wt (!) 304 lb (137.9 kg)   SpO2 94%   BMI 40.11 kg/m²   24HR INTAKE/OUTPUT:      Intake/Output Summary (Last 24 hours) at 2021 0755  Last data filed at 2021 6359  Gross per 24 hour   Intake 3727 ml   Output 1200 ml   Net 2527 ml     CURRENT PULSE OXIMETRY:  SpO2: 94 %  24HR PULSE OXIMETRY RANGE:  SpO2  Av.3 %  Min: 93 %  Max: 96 %      EXAM:  General: No distress. ENT: Pharynx clear. Neck: Trachea midline. Resp: No accessory muscle use. Diminished with faint rales bibasilar. No wheezing. No rhonchi. CV: Regular rate. Regular rhythm. No mumur or rub. ABD: Non-tender. Non-distended. Normal bowel sounds. Skin: Warm and dry. No rash on exposed extremities. Ext: No joint deformity. No clubbing. No cyanosis. No edema  Neuro: Awake.  Follows commands      Lab Results:  CBC:   Recent Labs     21  1020 21  0752 21  0658   WBC 5.3 9.0 11.3   HGB 13.7 14.4 15.5   HCT 42.5 44.8 47.5   MCV 87.8 86.2 86.8    280 199     BMP:   Recent Labs     21  1020      K 4.8      CO2 20*   BUN 19   CREATININE 0.8     LFT:   Recent Labs     21  1020   ALKPHOS 76   ALT 27 AST 32   PROT 6.6   BILITOT 0.5   BILIDIR <0.2   LABALBU 3.0*     PT/INR:   No results for input(s): PROTIME, INR in the last 72 hours. Cultures:  No results for input(s): CULTRESP in the last 72 hours. ABG:   No results for input(s): PH, PO2, PCO2, HCO3, BE, O2SAT in the last 72 hours. Films:  XR CHEST PORTABLE 05/23/21:  Lung volumes are diminished. Cardiac silhouette is enlarged. No pneumothorax. Moderate diffuse bilateral infiltrates. No obvious effusion. Diminished lung volume. Enlarged cardiac silhouette. Bilateral infiltrates. Findings may reflect edema or multifocal pneumonia. Continued follow-up recommended. CTA PULMONARY W CONTRAST 5/23/2021: No pulmonary embolism or aortic dissection. Normal-size lymph nodes without adenopathy by size criteria. Mild cardiomegaly. No pleural or pericardial effusion. Diffuse hepatic fatty infiltration. Visualized portions of the upper abdomen otherwise demonstrate no acute abnormality. No pneumothorax. There are fairly extensive ground-glass and patchy airspace infiltrates involving the right greater than left lung diffusely. No pulmonary embolism. Cardiomegaly. Bilateral parenchymal infiltrates which likely represent pneumonia, given the patient's reported history of viral infection. Continued follow-up recommended. 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 5/20 was seen in [facility] was admitted overnight started on Decadron and discharged on 2 L of oxygen  5/23 [facility] ER with increasing shortness of breath with saturations in the 70s dry cough  CTA bilateral pulmonary infiltrates no PE. Requiring 6 L up to 13 L. In the evening saturations dropped further requiring FiO2 60%.   Patient was given Toci  Procalcitonin 0.20,   5/25 placed on BiPAP 60%repeat procalcitonin 05/25 0.11  5/27 5/27: Bipap 12/6, fiO2 75% increased distress - transferred to

## 2021-05-27 NOTE — PROGRESS NOTES
Speech Language Pathology      NAME:  Christa Macias  :  1985  DATE: 2021  ROOM:  0215/0215-A         Order received. Chart reviewed. Attempted to complete clinical swallow eval in PM.      Pt unavailable at this time due to:  [] HOLD per RN  [] Off unit for testing/ procedure    [] With medical staff   [] Declined intervention  [] Sleeping/ Lethargic   [x] Other: recent decline in medical status and pt transferred to ICU      Will re-attempt as able when medically stable. Thank you. Acute respiratory failure due to COVID-19 ([facility] [U07.1, J96.00]            Yosvany ROME. VINNIE/SLP [id number]  Speech-Language Pathologist

## 2021-05-27 NOTE — PROGRESS NOTES
Physician Progress Note      PATIENT:               Jian Miguel  CSN #:                  [id number]  :                       1985  ADMIT DATE:       2021 5:42 AM  DISCH DATE:  RESPONDING  PROVIDER #:        Ericka Sofia DO          QUERY TEXT:    Dear Dr. Sammy Khalil,    Pt admitted with COVID-19 and noted to have fever, tachycardia, increased RR,   elevated lactic, procalcitonin and CRP. If possible, please document in   progress notes and discharge summary if you are evaluating and/or treating: The medical record reflects the following:  Risk Factors: COVID-19 pneumonia  Clinical Indicators: On admission T max 100.7, HR 99, RR 32, lactic 2.8, WBC   5.8-4.1, procalcitonin 0.20, CRP 6.7-12.2, Respiratory failure  Treatment: steroids, RDV, Toci, oxygen, IVF    Thank you,  Ruth Terry RN, CCDS  Clinical Documentation Improvement Specialist  [email or web address]  [phone]  Options provided:  -- Sepsis present on admission due to COVID-19 pneumonia  -- Sepsis not present on admission due to COVID-19 pneumonia  -- Covid-19 pneumonia without sepsis  -- Other - I will add my own diagnosis  -- Disagree - Not applicable / Not valid  -- Disagree - Clinically unable to determine / Unknown  -- Refer to Clinical Documentation Reviewer    PROVIDER RESPONSE TEXT:    This patient has sepsis which was present on admission due to COVID-19   pneumonia.     Query created by: Elsa Glaser on 2021 10:34 AM      Electronically signed by:  Ericka Sofia DO 2021 9:32 PM

## 2021-05-27 NOTE — PROGRESS NOTES
Spoke with patient regarding options for his dry mouth. States that the Malen Amass is impossible for him to tolerate, understands that there is a heat and moisture exchanger in line with the bipap circuit. Removed for a couple of minutes to take a drink of water and replace mepilex. Saturation recovered quickly after replacing mask.

## 2021-05-27 NOTE — CONSULTS
(PROTONIX) bolus  40 mg Intravenous Daily    insulin lispro  0-6 Units Subcutaneous TID WC    insulin lispro  0-3 Units Subcutaneous Nightly    sodium chloride flush  5-40 mL Intravenous 2 times per day    dexamethasone  6 mg Intravenous Q12H    zinc sulfate  50 mg Oral Daily    ascorbic acid  500 mg Oral BID    vitamin D  2,000 Units Oral Daily     LORazepam, magic (miracle) mouthwash, glucose, dextrose, glucagon (rDNA), dextrose, diphenhydrAMINE, diphenhydrAMINE, calcium carbonate, sodium chloride flush, sodium chloride, promethazine **OR** ondansetron, polyethylene glycol, acetaminophen **OR** acetaminophen, sodium chloride, albuterol sulfate HFA, benzonatate  IV Drips/Infusions   dexmedetomidine (PRECEDEX) IV infusion      dextrose      sodium chloride      sodium chloride 50 mL/hr at 05/26/21 2030     Home Medications  Medications Prior to Admission: allopurinol (ZYLOPRIM) 100 MG tablet, Take 100 mg by mouth daily    Diet/Nutrition   DIET GENERAL;    Allergies   Sulfa antibiotics    Social History   Tobacco   reports that he has never smoked. He has never used smokeless tobacco.    Alcohol     reports previous alcohol use. Occupational history :    Family History   History reviewed. No pertinent family history.     Sleep History   n/a    ROS     REVIEW OF SYSTEMS:  CONSTITUTIONAL:  negative except for  fevers  EYES:  negative for  double vision, blurred vision and visual disturbance  HEENT:  negative for  earaches, epistaxis, sore mouth and sore throat  RESPIRATORY:  positive for  cough with sputum, dyspnea and pleuritic pain  CARDIOVASCULAR:  negative for  chest pain, palpitations, syncope  GASTROINTESTINAL:  negative for nausea, vomiting, diarrhea and abdominal pain  GENITOURINARY:  negative for dysuria and hematuria  INTEGUMENT/BREAST:  negative for rash and skin lesion(s)  MUSCULOSKELETAL:  negative for  myalgias and arthralgias  NEUROLOGICAL:  negative for weakness and numbness  BEHAVIOR/PSYCH:  positive for anxiety    Lines and Devices    PIV L Upper arm    Mechanical Ventilation Data   VENT SETTINGS (Comprehensive)  Vent Information  Skin Assessment: Clean, dry, & intact  Equipment Changed:  (bag 1/3 full)  FiO2 : 100 %  SpO2: 90 %  SpO2/FiO2 ratio: 120  I Time/ I Time %: 0.9 s  Humidification Source: Heated wire  Humidification Temp: 31  Humidification Temp Measured: 31  Mask Type: Full face mask  Mask Size: Large  Additional Respiratory  Assessments  Pulse: 71  Resp: (!) 38  SpO2: 90 %  Humidification Source: Heated wire  Humidification Temp: 31    ABG  Lab Results   Component Value Date    PH 7.355 2021    PCO2 32.4 2021    PO2 60.8 2021    HCO3 17.7 2021    O2SAT 90.2 2021     Lab Results   Component Value Date    MODE BIPAP 2021           Vitals    height is 6' 1\" (1.854 m) and weight is 293 lb 4 oz (133 kg). His axillary temperature is 97.5 °F (36.4 °C). His blood pressure is 181/86 (abnormal) and his pulse is 71. His respiration is 38 (abnormal) and oxygen saturation is 90%.        Temperature Range: Temp: 97.5 °F (36.4 °C) Temp  Av.3 °F (36.3 °C)  Min: 97.1 °F (36.2 °C)  Max: 97.5 °F (36.4 °C)  BP Range:  Systolic (49YWW), LQZ:517 , Min:147 , YQI:546     Diastolic (56WMV), DTB:39, Min:86, Max:89    Pulse Range: Pulse  Av.5  Min: 60  Max: 71  Respiration Range: Resp  Av.4  Min: 22  Max: 38  Current Pulse Ox[de-identified]  SpO2: 90 %  24HR Pulse Ox Range:  SpO2  Av %  Min: 90 %  Max: 94 %  Oxygen Amount and Delivery: O2 Flow Rate (L/min): 55 L/min      I/O (24 Hours)    Patient Vitals for the past 8 hrs:   BP Temp Temp src Pulse Resp SpO2   21 1705 -- -- -- -- (!) 38 --   21 1313 -- -- -- -- (!) 36 --   21 1045 (!) 181/86 97.5 °F (36.4 °C) Axillary 71 (!) 36 90 %       Intake/Output Summary (Last 24 hours) at 2021 1750  Last data filed at 2021 1226  Gross per 24 hour   Intake 3727 ml   Output 500 ml   Net 3227 ml     I/O last 3 completed 24  Continue to monitor     Infectious Disease   Covid-19  Decadron 6mg q12H  Lasix 60mg  Vit D, Thiamine, Zinc and Vit C    Hematology/Oncology   WBC appropriate  Continue to monitor    Hg Appropriate  Continue to monitor and replace if <7    DDimer elevated >5250  Lovenox 0.5mg/kg BID    Endocrine   Elevated   LDSSI    Social/Spiritual/DNR/Other   Patient is a Full Code    Marychuy Chance DO  PGY -1    5/27/2021  5:50 PM    Patient was seen and evaluated by both myself and Johnny Burrell MD.    I personally saw, examined and provided care for the patient. Radiographs, labs and medication list were reviewed by me independently. I spoke with bedside nursing, therapists and consultants. Critical care services and times documented are independent of procedures and multidisciplinary rounds with Residents. Additionally comprehensive, multidisciplinary rounds were conducted with the MICU team. The case was discussed in detail and plans for care were established. Review of Residents documentation was conducted and revisions were made as appropriate. I agree with the above documented exam, problem list and plan of care. Bilateral ptx secondary to covid pneumonitis   S/p bilateral chest tubes with saturations improving to 90-94%. Very anxious and bipap dependent with precipitous desaturation when off bipap. Will facilitate transfer to [facility] for potential need for VV ECMO  Case discussed with the patient and his family at bedside CTS on board and will anticipate intervention if needed     Johnny Burrell M.D.    Pulmonary/Critical Care Medicine   75 min cct excluding procedures

## 2021-05-28 LAB
BLOOD CULTURE, ROUTINE: NORMAL
BLOOD CULTURE, ROUTINE: NORMAL

## 2021-05-28 NOTE — SIGNIFICANT EVENT
RRT- for intubation and stabilization  -noted b/l chest tubes placed  To go downtown to start ECMO?      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 5/20 was seen in [facility] was admitted overnight started on Decadron and discharged on 2 L of oxygen  5/23 [facility] ER with increasing shortness of breath with saturations in the 70s dry cough    5/23-- fairly extensive ground-glass and patchy airspace infiltrates involving the right greater than left lung diffusely. CTA bilateral pulmonary infiltrates no PE. Requiring 6 L up to 13 L. In the evening saturations dropped further requiring FiO2 60%. Patient was given Toci  Procalcitonin 0.20,   5/25 placed on BiPAP 60%repeat procalcitonin 05/25 0.11  5/27 5/27: Bipap 12/6, fiO2 75% increased distress - transferred to ICU      1. COVID pneumonia s/p remdesivir  --inc bmi- acute   2. Acute hypoxic respiratory failure secondary to COVID pneumonia on Decadron  3. Increase inflammatory response due to coronavirus  s/p Toci 5/23 D Dimer 333-->1231, Ferritin 1677, fibrinogen, CRP 6.7-->12.2-->5.8, .  4. Lactic acidosis  5. Hyperglycemia due to steroids  · Currently on bipap, wean to keep pox >88% - did not tolerate Optiflow with panic attacks. Ok for HFNC if able to tolerated for meals. · Lovenox 30 bid    Concerns for PE today-- inc work breahing, BiPAP 90% FiO2  Then noteed b/l pneumothorax  Cxr:  Bilateral all small to medium-sized right and small left pneumothoraces.    Parietal to visceral pleural distance on the right is 29 mm     tthen this afternoon:  Bilateral PTX  Bilateral Chest Tubes placed with improvement     On -20 suction--\"Air leak was noted by sides on water seal.\" bilateral  Low HR 50s noted  Sedation and emergent intubation attempted  Code blue called then  Large epigolttils, large tongue-- pre and post intubation \"lots of secretions, poorly visualized cords\"- suctioned aggressively  Tube placed,thru cords- b/l breath sounds heard, but lost pulse- chest compression started  Tube couldn't be secured for 5min- as chest compressions then shock x1 dlelivered  bc x1 possilbe fine Vfib shocked once  Fine Vfib then flat line  Chest compression and epi x3   Then   PEA - very sinus mark     onging CPR and bagging  Chest compressions, epi, bicarb etc  x30min     ABG sp intubation  Ph 7.317, pco2 48, po2 51, bicarb 24  Could hear Left breath sounds, but dec on R side  bc lost BS R lung, pulled ET tube back ( incase mainstem)  - rule progressive pneumothorax, air leak b/l tubes noted  I Pulled out ET tube and reinitated bag valve mask again  Continued chest compressions at this point for PEA  Intermittently we felt a weak pulse but later would lose it  ABG and I placed central line for pressors  Placed R tibia intraosseous, added epinephrine to levophed  Later added dopamine drip  Still intermittently bradycardia, PEA, and flat line      Later anasthesia arrived reintubated- continued bagging  End tidal in 30s, although o2 at 51, pulse ox 70s- he still never maintained at BP   (never got good perfusion pressure-for any period of time-- now 3 pressors tried)  Noted  more secretions suctioned- ET tube  Continue chest compression epi, bicarb, calcium  faimly arrived bedside- able to say \"goodbye\"  Sp ONE hour of nearly continuous coding- they requested cessation of continued chest compressions etc..    Time of death 10:11pm- cause of death covid pneumonia, +cytokine storm b/l pneumothorasix, PEA arrest and acute hypoxic respiratory arrest.

## 2021-05-28 NOTE — PROGRESS NOTES
Anesthesia was called for intubation. Spoke with Dr. Armand Woods at this time. She was unable to come because she was in O.R.  Dr. Maritza Wilkins was on the unit and came to do the intubation. Ambulatory

## 2021-05-28 NOTE — PROGRESS NOTES
Patient admitted from 6S to ICU room 215,  patient came down with belongings in bag he claims \"everything is in there from upstairs\" so I did not further check, placed on monitor, patient oriented to room and unit visiting hours. Patient guide at bedside, reviewed patient rights and responsibilities. MRSA nasal swab obtained. Bed alarm on. Call light within reach.

## 2021-06-04 NOTE — DISCHARGE SUMMARY
Internal Medicine Discharge Summary    NAME: Christa Macias :  1985  MRN:  [id number] PCP:Herve Rainey RN    ADMITTED: 2021   DISCHARGED: 2021 10:11 PM    ADMITTING PHYSICIAN: Fritz Guerrero    PCP: Marlene Garland RN    CONSULTANT(S):   IP CONSULT TO PHARMACY  IP CONSULT TO PULMONOLOGY  IP CONSULT TO INFECTIOUS DISEASES  IP CONSULT TO CRITICAL CARE  IP CONSULT TO IV TEAM     ADMITTING DIAGNOSIS:   Acute respiratory failure due to COVID-19 ([facility] [U07.1, J96.00]     Please see H&P for further details    DISCHARGE DIAGNOSES:   Active Hospital Problems    Diagnosis     Pneumonia due to COVID-19 virus [U07.1, J12.82]      Priority: High    Acute respiratory failure with hypoxia (HCC) [J96.01]      Priority: Medium    Dehydration [E86.0]     Elevated lactic acid level [R79.89]     Class 3 severe obesity with body mass index (BMI) of 40.0 to 44.9 in adult ([facility] [E66.01, Z68.41]        BRIEF HISTORY OF PRESENT ILLNESS: Christa Macias is a 43 y.o. male patient of Marlene Garland RN who  has no past medical history on file. who originally had concerns including Shortness of Breath (x 2 days, + COVID, admitted at [facility] discharged on O2, O2 in mid [de-identified] on 3L @ home). at presentation on 2021, and was found to have Acute respiratory failure due to COVID-19 ([facility] [U07.1, J96.00] after workup. Please see H&P for further details. HOSPITAL COURSE:   The patient presented to the hospital with the chief complaint of Shortness of Breath (x 2 days, + COVID, admitted at [facility] discharged on O2, O2 in mid [de-identified] on 3L @ home). The patient was admitted to the hospital.     He presented to the hospital with shortness of breath on . He first developed symptoms that were described as nausea, vomiting, body aches, fever, chills. He was noted to be COVID-19 positive. He was admitted to another hospital was given some steroids as well as IV fluids and discharged on oxygen.   Unfortunately his status declined and he had to come back to the hospital.  He was treated with remdesivir, steroids, IV fluids, oxygen. Pulmonology saw the patient. He was receiving Lovenox as well as a vitamin protocol. He received Toci on . He was not eating or drinking much. He was having a lot of anxiety. He was not able to tolerate the OptiFlow. BiPAP is in place. The patient's family did know me and asked for me to assume care for the patient on . At this point the patient did complete remdesivir. His oxygen status was worsening. I discussed the case with pulmonology. I felt that because his status was worsening and not improving that he would need to go to the ICU. I discussed the case with the intensivist who determined that the patient had bilateral pneumothorax. Bilateral chest tubes were placed. He was still requiring a significant amount of oxygenation. Decision was made for the patient to be transferred to [place] [address] due to the possibility of the patient requiring ECMO. Prior to transport intubation was required. Unfortunately the patient sustained cardiopulmonary arrest.  ACLS protocol was performed by the house officer, but unfortunately the patient . BRIEF PHYSICAL EXAMINATION AND LABORATORIES ON DAY OF DISCHARGE:  VITALS:  BP (!) 92/51   Pulse 50   Temp 99.4 °F (37.4 °C) (Bladder)   Resp 12   Ht 6' 1\" (1.854 m)   Wt 293 lb 4 oz (133 kg)   SpO2 91%   BMI 38.69 kg/m²     Please see note from the same day.      LABS[de-identified]  Lab Results   Component Value Date     WBC 11.3 2021     HGB 15.5 2021     HCT 47.5 2021      2021      2021     K 4.8 2021      2021     CREATININE 1.0 2021     BUN 24 (H) 2021     CO2 24 2021     GLUCOSE 195 (H) 2021     ALT 26 2021     AST 25 2021     INR 1.1 2021        Lab Results   Component Value Date    LABA1C 6.2 (H) 2021 Lab Results   Component Value Date    INR 1.1 05/23/2021    PROTIME 11.8 05/23/2021      No results found for: TSH  No results found for: TRIG, HDL, LDLCALC  No results for input(s): MG in the last 72 hours. No results for input(s): CKTOTAL, CKMB, TROPONINI in the last 72 hours. No results for input(s): LACTA in the last 72 hours. IMAGING:  XR CHEST PORTABLE    Result Date: 5/27/2021  EXAMINATION: ONE XRAY VIEW OF THE CHEST 5/27/2021 5:07 pm COMPARISON: Several chest radiographs from the same day. HISTORY: ORDERING SYSTEM PROVIDED HISTORY: chest tube insertion placement confirmation TECHNOLOGIST PROVIDED HISTORY: Reason for exam:->chest tube insertion placement confirmation FINDINGS: No change in position of the right thoracostomy tube. A pneumothorax is not seen on the right. Interval placement of left thoracostomy tube and re-expansion of the left lung. There is a very small persistent left apical pneumothorax with a parietal to visceral pleural distance of 5 mm. Persistent ill-defined opacities in the bilateral lungs. Remainder of the study is unchanged. 1.  Stable right thoracostomy tube. No residual pneumothorax identified. Interval placement of left thoracostomy tube. Very small left apical pneumothorax persists. 2.  Persistent bilateral pulmonary infiltrates. XR CHEST PORTABLE    Result Date: 5/27/2021  EXAMINATION: ONE XRAY VIEW OF THE CHEST 5/27/2021 4:44 pm COMPARISON: May 23 and May 27 HISTORY: ORDERING SYSTEM PROVIDED HISTORY: chest tube TECHNOLOGIST PROVIDED HISTORY: Reason for exam:->chest tube FINDINGS: Placement of right-sided chest tube, significant re-expansion of the right lung parenchyma and the reabsorption of the right-sided pneumothorax. If pneumothorax is present on the right-sided minimal. There is moderate to large size left-sided pneumothorax probable about 40%-45. The this is unchanged since the previous study. Heart appears to be enlarged.  Bilateral ground-glass density infiltrates throughout both lungs. The pneumonia versus pulmonary edema versus diffuse alveolar damage versus pulmonary hemorrhage are part of the differential diagnosis. Placement of right-sided chest tube with the re-expansion of the right lung. No conspicuous right-sided pneumothorax seen. Persistent the moderate the to large left-sided pneumothorax of approximately 48-45%. XR CHEST PORTABLE    Result Date: 5/27/2021  EXAMINATION: ONE XRAY VIEW OF THE CHEST 5/27/2021 3:26 pm COMPARISON: CT chest from May 23, 2021 HISTORY: ORDERING SYSTEM PROVIDED HISTORY: resp distress TECHNOLOGIST PROVIDED HISTORY: Reason for exam:->resp distress FINDINGS: Bilateral pneumothoraces are present. The parietal to pleural distance on the right is 29 mm. Parietal to pleural distance on the left is 14 mm. There is associated atelectatic changes related to partial lung collapse bilaterally. No obvious pleural effusion. Cardiomediastinal silhouette appears within normal limits. Bilateral ill-defined opacities in the lungs persist. Osseous and thoracic soft tissue structures demonstrate no acute findings. 1.  Bilateral all small to medium-sized right and small left pneumothoraces. Parietal to visceral pleural distance on the right is 29 mm. 2.  Persistent pulmonary infiltrates. No pleural effusions. At the time of interpretation, a repeat chest series had already been obtained following placement of a right thoracostomy tube. The findings were sent to the Radiology Results [id number] at 4:54 pm on 5/27/2021to be communicated to the patient's nurse. XR CHEST PORTABLE    Result Date: 5/23/2021  EXAMINATION: ONE XRAY VIEW OF THE CHEST 5/23/2021 6:12 am COMPARISON: None. HISTORY: ORDERING SYSTEM PROVIDED HISTORY: SOB TECHNOLOGIST PROVIDED HISTORY: Reason for exam:->SOB FINDINGS: EKG leads overlie the chest.  Lung volumes are diminished. Cardiac silhouette is enlarged. No pneumothorax.   Moderate diffuse bilateral infiltrates. No obvious effusion. Diminished lung volume. Enlarged cardiac silhouette. Bilateral infiltrates. Findings may reflect edema or multifocal pneumonia. Continued follow-up recommended. CTA PULMONARY W CONTRAST    Result Date: 5/23/2021  EXAMINATION: CTA OF THE CHEST 5/23/2021 6:57 am TECHNIQUE: CTA of the chest was performed after the administration of intravenous contrast.  Multiplanar reformatted images are provided for review. MIP images are provided for review. Dose modulation, iterative reconstruction, and/or weight based adjustment of the mA/kV was utilized to reduce the radiation dose to as low as reasonably achievable. COMPARISON: None. HISTORY: ORDERING SYSTEM PROVIDED HISTORY: r/o PE; +COVID TECHNOLOGIST PROVIDED HISTORY: Reason for exam:->r/o PE; +COVID Decision Support Exception - unselect if not a suspected or confirmed emergency medical condition->Emergency Medical Condition (MA) FINDINGS: No pulmonary embolism or aortic dissection. Normal-size lymph nodes without adenopathy by size criteria. Mild cardiomegaly. No pleural or pericardial effusion. Diffuse hepatic fatty infiltration. Visualized portions of the upper abdomen otherwise demonstrate no acute abnormality. No pneumothorax. There are fairly extensive ground-glass and patchy airspace infiltrates involving the right greater than left lung diffusely. No pulmonary embolism. Cardiomegaly. Bilateral parenchymal infiltrates which likely represent pneumonia, given the patient's reported history of viral infection. Continued follow-up recommended. MICROBIOLOGY:  BLOOD CX #1  No results for input(s): BC in the last 72 hours. BLOOD CX #2  No results for input(s): Christine Forts in the last 72 hours. TIP CULTURE  No results for input(s): CXCATHTIP in the last 72 hours. CULTURE, RESPIRATORY   No results for input(s): CULTRESP in the last 72 hours.   RESPIRATORY SMEAR  No results for input(s): RESPSMEAR in the last 72 hours. DISPOSITION:  The patient  during this hospital stay. Preparing for this patient's discharge, including paperwork, orders, instructions, and meeting with patient did required 34 minutes.     Electronically signed by Ramandeep Wiseman DO on 2021 at 8:57 AM